# Patient Record
Sex: FEMALE | Race: ASIAN | NOT HISPANIC OR LATINO
[De-identification: names, ages, dates, MRNs, and addresses within clinical notes are randomized per-mention and may not be internally consistent; named-entity substitution may affect disease eponyms.]

---

## 2019-01-10 PROBLEM — Z00.00 ENCOUNTER FOR PREVENTIVE HEALTH EXAMINATION: Status: ACTIVE | Noted: 2019-01-10

## 2020-07-13 DIAGNOSIS — Z01.818 ENCOUNTER FOR OTHER PREPROCEDURAL EXAMINATION: ICD-10-CM

## 2020-07-14 ENCOUNTER — APPOINTMENT (OUTPATIENT)
Dept: DISASTER EMERGENCY | Facility: CLINIC | Age: 30
End: 2020-07-14

## 2020-07-15 LAB — SARS-COV-2 N GENE NPH QL NAA+PROBE: NOT DETECTED

## 2020-07-16 ENCOUNTER — TRANSCRIPTION ENCOUNTER (OUTPATIENT)
Age: 30
End: 2020-07-16

## 2020-07-17 ENCOUNTER — OUTPATIENT (OUTPATIENT)
Dept: OUTPATIENT SERVICES | Facility: HOSPITAL | Age: 30
LOS: 1 days | Discharge: HOME | End: 2020-07-17

## 2022-02-04 ENCOUNTER — APPOINTMENT (OUTPATIENT)
Dept: ANTEPARTUM | Facility: CLINIC | Age: 32
End: 2022-02-04
Payer: COMMERCIAL

## 2022-02-04 ENCOUNTER — ASOB RESULT (OUTPATIENT)
Age: 32
End: 2022-02-04

## 2022-02-04 ENCOUNTER — NON-APPOINTMENT (OUTPATIENT)
Age: 32
End: 2022-02-04

## 2022-02-04 PROCEDURE — 36416 COLLJ CAPILLARY BLOOD SPEC: CPT

## 2022-02-04 PROCEDURE — ZZZZZ: CPT

## 2022-02-04 PROCEDURE — 76813 OB US NUCHAL MEAS 1 GEST: CPT

## 2022-02-07 LAB
1ST TRIMESTER DATA: NORMAL
ADDENDUM DOC: NORMAL
AFP PNL SERPL: NORMAL
AFP SERPL-ACNC: NORMAL
CLINICAL BIOCHEMIST REVIEW: NORMAL
FREE BETA HCG 1ST TRIMESTER: NORMAL
Lab: NORMAL
NASAL BONE: PRESENT
NOTES NTD: NORMAL
NT: NORMAL
PAPP-A SERPL-ACNC: NORMAL
TRISOMY 18/3: NORMAL

## 2022-04-01 ENCOUNTER — ASOB RESULT (OUTPATIENT)
Age: 32
End: 2022-04-01

## 2022-04-01 ENCOUNTER — APPOINTMENT (OUTPATIENT)
Dept: ANTEPARTUM | Facility: CLINIC | Age: 32
End: 2022-04-01
Payer: COMMERCIAL

## 2022-04-01 PROCEDURE — 76811 OB US DETAILED SNGL FETUS: CPT

## 2022-05-05 ENCOUNTER — OUTPATIENT (OUTPATIENT)
Dept: OUTPATIENT SERVICES | Facility: HOSPITAL | Age: 32
LOS: 1 days | Discharge: HOME | End: 2022-05-05
Payer: COMMERCIAL

## 2022-05-05 VITALS — HEART RATE: 100 BPM | DIASTOLIC BLOOD PRESSURE: 81 MMHG | SYSTOLIC BLOOD PRESSURE: 124 MMHG

## 2022-05-05 VITALS
DIASTOLIC BLOOD PRESSURE: 82 MMHG | HEART RATE: 95 BPM | RESPIRATION RATE: 16 BRPM | TEMPERATURE: 99 F | SYSTOLIC BLOOD PRESSURE: 140 MMHG

## 2022-05-05 DIAGNOSIS — Z3A.00 WEEKS OF GESTATION OF PREGNANCY NOT SPECIFIED: ICD-10-CM

## 2022-05-05 DIAGNOSIS — Z98.890 OTHER SPECIFIED POSTPROCEDURAL STATES: Chronic | ICD-10-CM

## 2022-05-05 DIAGNOSIS — O26.899 OTHER SPECIFIED PREGNANCY RELATED CONDITIONS, UNSPECIFIED TRIMESTER: ICD-10-CM

## 2022-05-05 PROCEDURE — 99213 OFFICE O/P EST LOW 20 MIN: CPT

## 2022-05-05 PROCEDURE — 99214 OFFICE O/P EST MOD 30 MIN: CPT | Mod: 25

## 2022-05-05 NOTE — OB PROVIDER TRIAGE NOTE - ADDITIONAL INSTRUCTIONS
Please drink 1-2 liters of fluid per day. Please go to your next scheduled prenatal appointment on 5/12/2022. Please drink 1-2 liters of fluid per day. Please go to your next scheduled prenatal appointment on 6/2/2022.

## 2022-05-05 NOTE — OB RN TRIAGE NOTE - NSLDARRIVAL_OBGYN_ALL_OB_DIFF_HHMM
Vaccine Information Statement(s) was given today. This has been reviewed, questions answered, and verbal consent given by Patient for injection(s) and administration of Hepatitis B.     Patient tolerated without incident. See immunization grid for documentation.     Patient informed to come back in  5 months for third injection.  
3 Hour(s) 16 Minute(s)

## 2022-05-05 NOTE — OB PROVIDER TRIAGE NOTE - NSOBPROVIDERNOTE_OBGYN_ALL_OB_FT
a/p: 32 y.o.  GENO 2022 @ 26 weeks hx decreased FM a/p: 32 y.o.  GENO 2022 @ 26 weeks hx decreased FM    1142a Discussed with Dr Whitten-Edilberto - reviewing NST. Plan for continued monitoring x 30 mins.    Viral, NP a/p: 32 y.o.  GENO 2022 @ 26 weeks hx decreased FM    1142a Discussed with Dr Phoenix - reviewing NST. Plan for continued monitoring x 30 mins.      1245p  Discussed with Dr Fish Kessler. Dr Fish Casanova reviewing NST. Plan for discharge home. Pt to follow up with next scheduled prenatal appointment.  labor precautions/fetal kick counts reviewed. Encouraged increased PO hydration.     Plan of care reviewed with patient and patient mother. Pt states understanding of above. In total ~ 1 hour spent with established/new patient.    Viral, NP a/p: 32 y.o.  GENO 2022 @ 26 weeks hx decreased FM    1142a Discussed with Dr Phoenix - reviewing NST. Plan for continued monitoring x 30 mins.      1245p  Discussed with Dr Fish Kessler. Dr Fish Casanova reviewing NST. Plan for discharge home. Pt to follow up with next scheduled prenatal appointment 2022.  labor precautions/fetal kick counts reviewed. Encouraged increased PO hydration.     Plan of care reviewed with patient and patient mother. Pt states understanding of above. In total ~ 1 hour spent with established/new patient.    Viral, NP

## 2022-05-05 NOTE — OB PROVIDER TRIAGE NOTE - HISTORY OF PRESENT ILLNESS
32 y.o.  GENO 2022 @ 26 weeks presents s/p decreased FM x 3 days. Pt states she presented to OB office yesterday and was seen by Dr Menchaca. Pt states she was advised to come immediately to triage for ultrasound and monitoring however pt was unable. Pt states +FM. Denies LOF, VB. States uncomplicated antepartum course.    allergies:  NKDA  medications:  prenatal vitamins    med/surg hx:  breast reduction age 21   32 y.o.  GENO 2022 @ 26 weeks presents s/p decreased FM x 3 days. Pt states she presented to OB office yesterday and was seen by Dr Menchaca. Pt states she was advised to come immediately to triage for ultrasound and monitoring however pt was unable. Pt states +FM. Denies LOF, VB. States uncomplicated antepartum course.    allergies:  NKDA  medications:  prenatal vitamins    med/surg/OBGYN hx:  breast reduction age 21

## 2022-05-05 NOTE — OB RN TRIAGE NOTE - FALL HARM RISK - UNIVERSAL INTERVENTIONS
Bed in lowest position, wheels locked, appropriate side rails in place/Call bell, personal items and telephone in reach/Instruct patient to call for assistance before getting out of bed or chair/Non-slip footwear when patient is out of bed/[place] to call system/Physically safe environment - no spills, clutter or unnecessary equipment/Purposeful Proactive Rounding/Room/bathroom lighting operational, light cord in reach

## 2022-05-24 NOTE — OB RN TRIAGE NOTE - AS TEMP SITE
Family brought ED room for update. Dietary called for bereavement tray. AC called for spiritual care.    oral

## 2022-06-20 ENCOUNTER — APPOINTMENT (OUTPATIENT)
Dept: ANTEPARTUM | Facility: CLINIC | Age: 32
End: 2022-06-20
Payer: COMMERCIAL

## 2022-06-20 ENCOUNTER — ASOB RESULT (OUTPATIENT)
Age: 32
End: 2022-06-20

## 2022-06-20 PROCEDURE — 76816 OB US FOLLOW-UP PER FETUS: CPT

## 2022-08-18 ENCOUNTER — INPATIENT (INPATIENT)
Facility: HOSPITAL | Age: 32
LOS: 2 days | Discharge: HOME | End: 2022-08-21
Attending: OBSTETRICS & GYNECOLOGY | Admitting: OBSTETRICS & GYNECOLOGY

## 2022-08-18 VITALS
DIASTOLIC BLOOD PRESSURE: 87 MMHG | RESPIRATION RATE: 17 BRPM | HEART RATE: 80 BPM | SYSTOLIC BLOOD PRESSURE: 142 MMHG | TEMPERATURE: 98 F

## 2022-08-18 DIAGNOSIS — O26.899 OTHER SPECIFIED PREGNANCY RELATED CONDITIONS, UNSPECIFIED TRIMESTER: ICD-10-CM

## 2022-08-18 DIAGNOSIS — Z98.890 OTHER SPECIFIED POSTPROCEDURAL STATES: Chronic | ICD-10-CM

## 2022-08-18 DIAGNOSIS — Z3A.00 WEEKS OF GESTATION OF PREGNANCY NOT SPECIFIED: ICD-10-CM

## 2022-08-18 NOTE — OB RN TRIAGE NOTE - FALL HARM RISK - UNIVERSAL INTERVENTIONS
Bed in lowest position, wheels locked, appropriate side rails in place/Call bell, personal items and telephone in reach/Instruct patient to call for assistance before getting out of bed or chair/Non-slip footwear when patient is out of bed/[place] to call system/Physically safe environment - no spills, clutter or unnecessary equipment/Purposeful Proactive Rounding/Room/bathroom lighting operational, light cord in reach

## 2022-08-18 NOTE — OB RN TRIAGE NOTE - CHIEF COMPLAINT QUOTE
"Pressure on the back of my body, it's  not  contractions, I am scheduled for IOL for post due dates tomorrow"

## 2022-08-19 ENCOUNTER — TRANSCRIPTION ENCOUNTER (OUTPATIENT)
Age: 32
End: 2022-08-19

## 2022-08-19 DIAGNOSIS — O61.9 FAILED INDUCTION OF LABOR, UNSPECIFIED: ICD-10-CM

## 2022-08-19 PROBLEM — Z78.9 OTHER SPECIFIED HEALTH STATUS: Chronic | Status: ACTIVE | Noted: 2022-05-05

## 2022-08-19 LAB
ALBUMIN SERPL ELPH-MCNC: 3.5 G/DL — SIGNIFICANT CHANGE UP (ref 3.3–5)
ALP SERPL-CCNC: 308 U/L — HIGH (ref 40–120)
ALT FLD-CCNC: 10 U/L — SIGNIFICANT CHANGE UP (ref 4–33)
ANION GAP SERPL CALC-SCNC: 13 MMOL/L — SIGNIFICANT CHANGE UP (ref 7–14)
APPEARANCE UR: ABNORMAL
APTT BLD: 26 SEC — LOW (ref 27–36.3)
AST SERPL-CCNC: 16 U/L — SIGNIFICANT CHANGE UP (ref 4–32)
BACTERIA # UR AUTO: ABNORMAL
BASOPHILS # BLD AUTO: 0.05 K/UL — SIGNIFICANT CHANGE UP (ref 0–0.2)
BASOPHILS NFR BLD AUTO: 0.5 % — SIGNIFICANT CHANGE UP (ref 0–2)
BILIRUB SERPL-MCNC: <0.2 MG/DL — SIGNIFICANT CHANGE UP (ref 0.2–1.2)
BILIRUB UR-MCNC: NEGATIVE — SIGNIFICANT CHANGE UP
BLD GP AB SCN SERPL QL: NEGATIVE — SIGNIFICANT CHANGE UP
BUN SERPL-MCNC: 7 MG/DL — SIGNIFICANT CHANGE UP (ref 7–23)
CALCIUM SERPL-MCNC: 9 MG/DL — SIGNIFICANT CHANGE UP (ref 8.4–10.5)
CHLORIDE SERPL-SCNC: 103 MMOL/L — SIGNIFICANT CHANGE UP (ref 98–107)
CO2 SERPL-SCNC: 19 MMOL/L — LOW (ref 22–31)
COLOR SPEC: YELLOW — SIGNIFICANT CHANGE UP
COVID-19 SPIKE DOMAIN AB INTERP: POSITIVE
COVID-19 SPIKE DOMAIN ANTIBODY RESULT: >250 U/ML — HIGH
CREAT ?TM UR-MCNC: 178 MG/DL — SIGNIFICANT CHANGE UP
CREAT SERPL-MCNC: 0.56 MG/DL — SIGNIFICANT CHANGE UP (ref 0.5–1.3)
DIFF PNL FLD: ABNORMAL
EGFR: 126 ML/MIN/1.73M2 — SIGNIFICANT CHANGE UP
EOSINOPHIL # BLD AUTO: 0.09 K/UL — SIGNIFICANT CHANGE UP (ref 0–0.5)
EOSINOPHIL NFR BLD AUTO: 0.8 % — SIGNIFICANT CHANGE UP (ref 0–6)
EPI CELLS # UR: 19 /HPF — HIGH (ref 0–5)
FIBRINOGEN PPP-MCNC: 722 MG/DL — HIGH (ref 330–520)
GLUCOSE SERPL-MCNC: 92 MG/DL — SIGNIFICANT CHANGE UP (ref 70–99)
GLUCOSE UR QL: NEGATIVE — SIGNIFICANT CHANGE UP
HCT VFR BLD CALC: 38.9 % — SIGNIFICANT CHANGE UP (ref 34.5–45)
HGB BLD-MCNC: 12.3 G/DL — SIGNIFICANT CHANGE UP (ref 11.5–15.5)
HYALINE CASTS # UR AUTO: 7 /LPF — SIGNIFICANT CHANGE UP (ref 0–7)
IANC: 6.95 K/UL — SIGNIFICANT CHANGE UP (ref 1.8–7.4)
IMM GRANULOCYTES NFR BLD AUTO: 0.5 % — SIGNIFICANT CHANGE UP (ref 0–1.5)
INR BLD: 0.95 RATIO — SIGNIFICANT CHANGE UP (ref 0.88–1.16)
KETONES UR-MCNC: NEGATIVE — SIGNIFICANT CHANGE UP
LDH SERPL L TO P-CCNC: 162 U/L — SIGNIFICANT CHANGE UP (ref 135–225)
LEUKOCYTE ESTERASE UR-ACNC: NEGATIVE — SIGNIFICANT CHANGE UP
LYMPHOCYTES # BLD AUTO: 2.89 K/UL — SIGNIFICANT CHANGE UP (ref 1–3.3)
LYMPHOCYTES # BLD AUTO: 26.8 % — SIGNIFICANT CHANGE UP (ref 13–44)
MCHC RBC-ENTMCNC: 24.3 PG — LOW (ref 27–34)
MCHC RBC-ENTMCNC: 31.6 GM/DL — LOW (ref 32–36)
MCV RBC AUTO: 76.7 FL — LOW (ref 80–100)
MONOCYTES # BLD AUTO: 0.77 K/UL — SIGNIFICANT CHANGE UP (ref 0–0.9)
MONOCYTES NFR BLD AUTO: 7.1 % — SIGNIFICANT CHANGE UP (ref 2–14)
NEUTROPHILS # BLD AUTO: 6.95 K/UL — SIGNIFICANT CHANGE UP (ref 1.8–7.4)
NEUTROPHILS NFR BLD AUTO: 64.3 % — SIGNIFICANT CHANGE UP (ref 43–77)
NITRITE UR-MCNC: NEGATIVE — SIGNIFICANT CHANGE UP
NRBC # BLD: 0 /100 WBCS — SIGNIFICANT CHANGE UP (ref 0–0)
NRBC # FLD: 0 K/UL — SIGNIFICANT CHANGE UP (ref 0–0)
PH UR: 6.5 — SIGNIFICANT CHANGE UP (ref 5–8)
PLATELET # BLD AUTO: 298 K/UL — SIGNIFICANT CHANGE UP (ref 150–400)
POTASSIUM SERPL-MCNC: 4 MMOL/L — SIGNIFICANT CHANGE UP (ref 3.5–5.3)
POTASSIUM SERPL-SCNC: 4 MMOL/L — SIGNIFICANT CHANGE UP (ref 3.5–5.3)
PROT ?TM UR-MCNC: 48 MG/DL — SIGNIFICANT CHANGE UP
PROT ?TM UR-MCNC: 48 MG/DL — SIGNIFICANT CHANGE UP
PROT SERPL-MCNC: 6.9 G/DL — SIGNIFICANT CHANGE UP (ref 6–8.3)
PROT UR-MCNC: ABNORMAL
PROT/CREAT UR-RTO: 0.3 RATIO — HIGH (ref 0–0.2)
PROTHROM AB SERPL-ACNC: 11 SEC — SIGNIFICANT CHANGE UP (ref 10.5–13.4)
RBC # BLD: 5.07 M/UL — SIGNIFICANT CHANGE UP (ref 3.8–5.2)
RBC # FLD: 15.8 % — HIGH (ref 10.3–14.5)
RBC CASTS # UR COMP ASSIST: 3 /HPF — SIGNIFICANT CHANGE UP (ref 0–4)
RH IG SCN BLD-IMP: POSITIVE — SIGNIFICANT CHANGE UP
RH IG SCN BLD-IMP: POSITIVE — SIGNIFICANT CHANGE UP
SARS-COV-2 IGG+IGM SERPL QL IA: >250 U/ML — HIGH
SARS-COV-2 IGG+IGM SERPL QL IA: POSITIVE
SARS-COV-2 RNA SPEC QL NAA+PROBE: SIGNIFICANT CHANGE UP
SODIUM SERPL-SCNC: 135 MMOL/L — SIGNIFICANT CHANGE UP (ref 135–145)
SP GR SPEC: 1.03 — SIGNIFICANT CHANGE UP (ref 1.01–1.05)
T PALLIDUM AB TITR SER: NEGATIVE — SIGNIFICANT CHANGE UP
URATE SERPL-MCNC: 3.8 MG/DL — SIGNIFICANT CHANGE UP (ref 2.5–7)
UROBILINOGEN FLD QL: SIGNIFICANT CHANGE UP
WBC # BLD: 10.8 K/UL — HIGH (ref 3.8–10.5)
WBC # FLD AUTO: 10.8 K/UL — HIGH (ref 3.8–10.5)
WBC UR QL: 11 /HPF — HIGH (ref 0–5)

## 2022-08-19 RX ORDER — AMPICILLIN TRIHYDRATE 250 MG
1 CAPSULE ORAL EVERY 4 HOURS
Refills: 0 | Status: DISCONTINUED | OUTPATIENT
Start: 2022-08-19 | End: 2022-08-19

## 2022-08-19 RX ORDER — CHLORHEXIDINE GLUCONATE 213 G/1000ML
1 SOLUTION TOPICAL ONCE
Refills: 0 | Status: DISCONTINUED | OUTPATIENT
Start: 2022-08-19 | End: 2022-08-19

## 2022-08-19 RX ORDER — PRAMOXINE HYDROCHLORIDE 150 MG/15G
1 AEROSOL, FOAM RECTAL EVERY 4 HOURS
Refills: 0 | Status: DISCONTINUED | OUTPATIENT
Start: 2022-08-19 | End: 2022-08-21

## 2022-08-19 RX ORDER — OXYTOCIN 10 UNIT/ML
2 VIAL (ML) INJECTION
Qty: 30 | Refills: 0 | Status: DISCONTINUED | OUTPATIENT
Start: 2022-08-19 | End: 2022-08-19

## 2022-08-19 RX ORDER — CHLORHEXIDINE GLUCONATE 213 G/1000ML
1 SOLUTION TOPICAL ONCE
Refills: 0 | Status: DISCONTINUED | OUTPATIENT
Start: 2022-08-18 | End: 2022-08-19

## 2022-08-19 RX ORDER — ONDANSETRON 8 MG/1
4 TABLET, FILM COATED ORAL ONCE
Refills: 0 | Status: COMPLETED | OUTPATIENT
Start: 2022-08-19 | End: 2022-08-19

## 2022-08-19 RX ORDER — AMPICILLIN TRIHYDRATE 250 MG
1 CAPSULE ORAL EVERY 4 HOURS
Refills: 0 | Status: DISCONTINUED | OUTPATIENT
Start: 2022-08-18 | End: 2022-08-19

## 2022-08-19 RX ORDER — OXYCODONE HYDROCHLORIDE 5 MG/1
5 TABLET ORAL ONCE
Refills: 0 | Status: DISCONTINUED | OUTPATIENT
Start: 2022-08-19 | End: 2022-08-21

## 2022-08-19 RX ORDER — AMPICILLIN TRIHYDRATE 250 MG
2 CAPSULE ORAL ONCE
Refills: 0 | Status: COMPLETED | OUTPATIENT
Start: 2022-08-19 | End: 2022-08-19

## 2022-08-19 RX ORDER — AER TRAVELER 0.5 G/1
1 SOLUTION RECTAL; TOPICAL EVERY 4 HOURS
Refills: 0 | Status: DISCONTINUED | OUTPATIENT
Start: 2022-08-19 | End: 2022-08-21

## 2022-08-19 RX ORDER — LANOLIN
1 OINTMENT (GRAM) TOPICAL EVERY 6 HOURS
Refills: 0 | Status: DISCONTINUED | OUTPATIENT
Start: 2022-08-19 | End: 2022-08-21

## 2022-08-19 RX ORDER — OXYTOCIN 10 UNIT/ML
333.33 VIAL (ML) INJECTION
Qty: 20 | Refills: 0 | Status: COMPLETED | OUTPATIENT
Start: 2022-08-18 | End: 2022-08-18

## 2022-08-19 RX ORDER — DIBUCAINE 1 %
1 OINTMENT (GRAM) RECTAL EVERY 6 HOURS
Refills: 0 | Status: DISCONTINUED | OUTPATIENT
Start: 2022-08-19 | End: 2022-08-21

## 2022-08-19 RX ORDER — ACETAMINOPHEN 500 MG
975 TABLET ORAL
Refills: 0 | Status: DISCONTINUED | OUTPATIENT
Start: 2022-08-19 | End: 2022-08-21

## 2022-08-19 RX ORDER — OXYCODONE HYDROCHLORIDE 5 MG/1
5 TABLET ORAL
Refills: 0 | Status: DISCONTINUED | OUTPATIENT
Start: 2022-08-19 | End: 2022-08-21

## 2022-08-19 RX ORDER — SODIUM CHLORIDE 9 MG/ML
1000 INJECTION, SOLUTION INTRAVENOUS
Refills: 0 | Status: DISCONTINUED | OUTPATIENT
Start: 2022-08-19 | End: 2022-08-19

## 2022-08-19 RX ORDER — SIMETHICONE 80 MG/1
80 TABLET, CHEWABLE ORAL EVERY 4 HOURS
Refills: 0 | Status: DISCONTINUED | OUTPATIENT
Start: 2022-08-19 | End: 2022-08-21

## 2022-08-19 RX ORDER — DIPHENHYDRAMINE HCL 50 MG
25 CAPSULE ORAL EVERY 6 HOURS
Refills: 0 | Status: DISCONTINUED | OUTPATIENT
Start: 2022-08-19 | End: 2022-08-21

## 2022-08-19 RX ORDER — OXYTOCIN 10 UNIT/ML
333.33 VIAL (ML) INJECTION
Qty: 20 | Refills: 0 | Status: DISCONTINUED | OUTPATIENT
Start: 2022-08-19 | End: 2022-08-20

## 2022-08-19 RX ORDER — MAGNESIUM HYDROXIDE 400 MG/1
30 TABLET, CHEWABLE ORAL
Refills: 0 | Status: DISCONTINUED | OUTPATIENT
Start: 2022-08-19 | End: 2022-08-21

## 2022-08-19 RX ORDER — TETANUS TOXOID, REDUCED DIPHTHERIA TOXOID AND ACELLULAR PERTUSSIS VACCINE, ADSORBED 5; 2.5; 8; 8; 2.5 [IU]/.5ML; [IU]/.5ML; UG/.5ML; UG/.5ML; UG/.5ML
0.5 SUSPENSION INTRAMUSCULAR ONCE
Refills: 0 | Status: DISCONTINUED | OUTPATIENT
Start: 2022-08-19 | End: 2022-08-21

## 2022-08-19 RX ORDER — HYDROCORTISONE 1 %
1 OINTMENT (GRAM) TOPICAL EVERY 6 HOURS
Refills: 0 | Status: DISCONTINUED | OUTPATIENT
Start: 2022-08-19 | End: 2022-08-21

## 2022-08-19 RX ORDER — SODIUM CHLORIDE 9 MG/ML
3 INJECTION INTRAMUSCULAR; INTRAVENOUS; SUBCUTANEOUS EVERY 8 HOURS
Refills: 0 | Status: DISCONTINUED | OUTPATIENT
Start: 2022-08-19 | End: 2022-08-21

## 2022-08-19 RX ORDER — IBUPROFEN 200 MG
600 TABLET ORAL EVERY 6 HOURS
Refills: 0 | Status: COMPLETED | OUTPATIENT
Start: 2022-08-19 | End: 2023-07-18

## 2022-08-19 RX ORDER — BENZOCAINE 10 %
1 GEL (GRAM) MUCOUS MEMBRANE EVERY 6 HOURS
Refills: 0 | Status: DISCONTINUED | OUTPATIENT
Start: 2022-08-19 | End: 2022-08-21

## 2022-08-19 RX ORDER — OXYTOCIN 10 UNIT/ML
VIAL (ML) INJECTION
Qty: 30 | Refills: 0 | Status: DISCONTINUED | OUTPATIENT
Start: 2022-08-19 | End: 2022-08-19

## 2022-08-19 RX ORDER — SODIUM CHLORIDE 9 MG/ML
1000 INJECTION, SOLUTION INTRAVENOUS
Refills: 0 | Status: DISCONTINUED | OUTPATIENT
Start: 2022-08-18 | End: 2022-08-19

## 2022-08-19 RX ORDER — KETOROLAC TROMETHAMINE 30 MG/ML
30 SYRINGE (ML) INJECTION ONCE
Refills: 0 | Status: DISCONTINUED | OUTPATIENT
Start: 2022-08-19 | End: 2022-08-19

## 2022-08-19 RX ADMIN — Medication 30 MILLIGRAM(S): at 19:45

## 2022-08-19 RX ADMIN — Medication 216 GRAM(S): at 05:28

## 2022-08-19 RX ADMIN — Medication 1000 MILLIUNIT(S)/MIN: at 21:18

## 2022-08-19 RX ADMIN — SODIUM CHLORIDE 125 MILLILITER(S): 9 INJECTION, SOLUTION INTRAVENOUS at 05:27

## 2022-08-19 RX ADMIN — ONDANSETRON 4 MILLIGRAM(S): 8 TABLET, FILM COATED ORAL at 18:12

## 2022-08-19 RX ADMIN — Medication 108 GRAM(S): at 17:17

## 2022-08-19 RX ADMIN — Medication 108 GRAM(S): at 09:10

## 2022-08-19 RX ADMIN — Medication 30 MILLIGRAM(S): at 20:15

## 2022-08-19 RX ADMIN — Medication 2 MILLIUNIT(S)/MIN: at 09:10

## 2022-08-19 RX ADMIN — SODIUM CHLORIDE 125 MILLILITER(S): 9 INJECTION, SOLUTION INTRAVENOUS at 09:11

## 2022-08-19 RX ADMIN — SODIUM CHLORIDE 125 MILLILITER(S): 9 INJECTION, SOLUTION INTRAVENOUS at 13:25

## 2022-08-19 RX ADMIN — Medication 2 MILLIUNIT(S)/MIN: at 16:23

## 2022-08-19 RX ADMIN — Medication 108 GRAM(S): at 13:25

## 2022-08-19 NOTE — DISCHARGE NOTE OB - IF YOU HAVE EPISIOTOMY OR TEAR REPAIR, USE WARM WATER SITZ BATH FOR RELIEF OF DISCOMFORT
INDICATION:

CALCULUS OF KIDNEY



COMPARISON:

04/16/2021



TECHNIQUE:

Real time gray scale ultrasound examination using curved array transducer.



FINDINGS:

Left kidney is normal in contour, size, echogenicity and reniform shape measuring 9.9

x 4.2 x 4.3 cm without hydronephrosis, nephrolithiasis, cystic or renal mass lesion.



The right kidney is normal in contour, size, echogenicity and reniform shape measuring

10.5 x 4.0 x 4.2 cm with 6 mm lower pole nonobstructing calculus.  No hydronephrosis,

cystic or renal mass lesion.



Bladder is normal and bilateral ureteral jets are identified.





IMPRESSION:

6 mm nonobstructing right renal calculus.  No hydronephrosis.





<Electronically signed by Rui Nichols > 05/10/21 1524 Statement Selected

## 2022-08-19 NOTE — DISCHARGE NOTE OB - MEDICATION SUMMARY - MEDICATIONS TO TAKE
I will START or STAY ON the medications listed below when I get home from the hospital:    ibuprofen 200 mg oral tablet  -- 3 tab(s) by mouth every 6 hours  -- Indication: For pain    Tylenol 325 mg oral tablet  -- 3 tab(s) by mouth every 6 hours  -- Indication: For pain    Prenatal 1 oral capsule  -- Indication: For supplement

## 2022-08-19 NOTE — OB PROVIDER H&P - ASSESSMENT
32y  at 41w0d with oligohydramnios  Pt with elevated BP in triage, asymptomatic  GBS: Positive   D/w Dr Landrum  -Admit to labor and delivery  -Pain Management prn  -Cont EFM/[place]  -Admission labs: CBC, RPR, T&S and PEC labs sent  -IV hydration  -Clear liquid diet  -Buccal Cytotec for IOL  -Ampicillin for GBS prophylaxis

## 2022-08-19 NOTE — OB PROVIDER H&P - ATTENDING COMMENTS
OB Attending Note    P0 presents w/ early labor, found incidentally to have oligohydramnios.  Admitted for IOL for oligohydramnios and late term.  Reassuring fetal status with reactive NST.  Labile BP's on admission, not meeting criteria for hypertensive disorder.  HELLP labs pending.    For buccal cytotec and cervical balloon for IOL.     JUDIE Landrum MD

## 2022-08-19 NOTE — OB PROVIDER LABOR PROGRESS NOTE - ASSESSMENT
Plan: 32y y/o  @ 41w4d in stable condition  - Cont'd IOL with buccal cytotec  - Cervical balloon placed; uterine and vaginal balloons instilled with 70cc sterile saline, pt tolerated well  - Continuous EFM, [place]  - Con't IVF    d/w attending physician Dr. Lucita Pereira MD  PGY-2

## 2022-08-19 NOTE — DISCHARGE NOTE OB - PATIENT PORTAL LINK FT
You can access the FollowMyHealth Patient Portal offered by [facility] by registering at the following website: [email or web address]. By joining [employer]’s FollowMyHealth portal, you will also be able to view your health information using other applications (apps) compatible with our system.

## 2022-08-19 NOTE — OB PROVIDER LABOR PROGRESS NOTE - NS_SUBJECTIVE/OBJECTIVE_OBGYN_ALL_OB_FT
R2 Labor & Delivery Progress Note     Pt seen & examined at bedside for placement of cervical balloon.    T(C): 36.9 (08-19-22 @ 05:22), Max: 36.9 (08-19-22 @ 04:35)  HR: 80 (08-19-22 @ 05:21) (74 - 92)  BP: 128/96 (08-19-22 @ 05:21) (128/96 - 151/104)  RR: 18 (08-19-22 @ 04:35) (17 - 18)  SpO2: --

## 2022-08-19 NOTE — DISCHARGE NOTE OB - CARE PLAN
Assessment and plan of treatment:	Follow-up for appointment for blood pressure check in 1 week.  Check blood pressure at home. Call your doctor if greater than 150/90, or if you have headache, changes in vision or pain in upper abdomen.  Nothing per vagina x 6 weeks.  Full postpartum check at 6weeks.   1 Principal Discharge DX:	Vaginal delivery  Assessment and plan of treatment:	Follow-up for appointment for blood pressure check in 1 week.  Check blood pressure at home. Call your doctor if greater than 150/90, or if you have headache, changes in vision or pain in upper abdomen.  Nothing per vagina x 6 weeks.  Full postpartum check at 6weeks.

## 2022-08-19 NOTE — CHART NOTE - NSCHARTNOTEFT_GEN_A_CORE
PA Note    called by RN to bedside for 4 min prolonged deceleration. Upon arrival to room 's, patient on L lateral, VE performed 7-8/90/-1 ISE placed for continuous monitoring. Dr Fihs Casanova at bedside.  Patient repositioned. Charge RN made aware that patient needs to be transferred to LDR. Resuscitative measures in place. Category II tracing     cont to closely monitor  ldevito pa PA Note    called by RN to bedside for 4 min prolonged deceleration. Upon arrival to room 's & pitocin was discontinued, patient on L lateral, VE performed 7-8/90/-1 ISE placed for continuous monitoring. Dr Fish Casanova at bedside.  Patient repositioned. Charge RN made aware that patient needs to be transferred to LDR. Resuscitative measures in place. Category II tracing     cont to closely monitor  evito pa

## 2022-08-19 NOTE — OB RN DELIVERY SUMMARY - NSSELHIDDEN_OBGYN_ALL_OB_FT
[NS_DeliveryAttending1_OBGYN_ALL_OB_FT:[id number]],[NS_DeliveryAttending2_OBGYN_ALL_OB_FT:[id number]],[NS_DeliveryRN_OBGYN_ALL_OB_FT:[id number]=]

## 2022-08-19 NOTE — OB PROVIDER DELIVERY SUMMARY - NSPROVIDERDELIVERYNOTE_OBGYN_ALL_OB_FT
Pt fully dilated and pushing.  Delivered viable infant over intact perineum.  Spontaneous cry.  Nose and mouth bulb suctioned.  Cord clamped and cut after delay.  Infant handed to awaiting pediatricians.  Cord gases sent.  Placenta delivered spontaneously and intact.  2nd degree laceration repaired with 2-0 chromic with excellent hemostasis and restoration of anatomy.  Fundus firm.  Vault empty.

## 2022-08-19 NOTE — PROVIDER CONTACT NOTE (OTHER) - ASSESSMENT
Patient is asymptomatic. No complaints of pain or dizziness; Patient ambulated to the bathroom and voided. Moderate bleeding fundus is at umbilicus

## 2022-08-19 NOTE — CHART NOTE - NSCHARTNOTEFT_GEN_A_CORE
OB Attending    Pt reexamined at bedside.  VE 10/100/+1.  Cat I FHT.  Plan to start pushing.    MD Israel

## 2022-08-19 NOTE — CHART NOTE - NSCHARTNOTEFT_GEN_A_CORE
PA Note    seen & examined for cont of management  comfortable    VS  T(C): 36.7 (08-19-22 @ 13:23)  HR: 98 (08-19-22 @ 13:50)  BP: 126/88 (08-19-22 @ 13:43)  RR: 18 (08-19-22 @ 04:35)  SpO2: 92% (08-19-22 @ 13:50)    130/mod cary/+accels/no decels  [place] irreg   5/80/-2 AROM thick meconium    cont efm/toco  cont pitocin  dw Dr Fish ocampo

## 2022-08-19 NOTE — OB PROVIDER LABOR PROGRESS NOTE - ASSESSMENT
33 yo  at 41/1 weeks  - notified by RN that cervical balloon fell out  - plan for pitocin  - cont EFM/toco    d/w Dr Alban kumari PA-C 31 yo  at 41/1 weeks  - notified by RN that cervical balloon fell out  - plan for pitocin  - cont EFM/toco    d/w Dr Alban kumari PA-C    Attending Note   Agree with above  Pt wants epidural now   AROM with next exam     VINCE Phoenix MD

## 2022-08-19 NOTE — DISCHARGE NOTE OB - NS MD DC FALL RISK RISK
For information on Fall & Injury Prevention, visit: [email or web address] OR  [email or web address] OR  https://www.cdc.gov/steadi/patient.html

## 2022-08-19 NOTE — OB PROVIDER H&P - HISTORY OF PRESENT ILLNESS
32y  at 41w0d presents to triage c/o lower back pain and pelvic pressure.  Reports +FM, no vaginal bleeding, no ROM or LOF  Prenatal care: Dr Rahman, denies any prenatal complications.  Pt is scheduled for induction of labor tomorrow the .  GBS: Positive    GYN: Denies any h/o STDs, fibroids, ovarian Cyst, or abnormal pap smear  OBH:   PAST MEDICAL & SURGICAL HISTORY:  age 21  bilateral breast reduction surgery  No Known Allergies

## 2022-08-19 NOTE — DISCHARGE NOTE OB - PLAN OF CARE
Follow-up for appointment for blood pressure check in 1 week.  Check blood pressure at home. Call your doctor if greater than 150/90, or if you have headache, changes in vision or pain in upper abdomen.  Nothing per vagina x 6 weeks.  Full postpartum check at 6weeks.

## 2022-08-19 NOTE — OB RN DELIVERY SUMMARY - NS_SEPSISRSKCALC_OBGYN_ALL_OB_FT
EOS calculated successfully. EOS Risk Factor: 0.5/1000 live births ([facility] national incidence); GA=41w1d; Temp=98.6; ROM=5.9; GBS='Positive'; Antibiotics='GBS specific antibiotics > 2 hrs prior to birth'

## 2022-08-19 NOTE — OB NEONATOLOGY/PEDIATRICIAN DELIVERY SUMMARY - NSPEDSNEONOTESA_OBGYN_ALL_OB_FT
Requested by OB to attend this vaginal delivery at 41 weeks for oligohydramnios, meconium. Mother is a 32 year old,  , blood type B pos.  Prenatal labs as follow: HIV neg, RPR non-reactive, rubella nonimmune, HBsA neg, GBS pos on , s/p ampicillin x 4.  This prenancy was complicated by gestational HTN, oligohydramnios. ROM at 1320 with mec fluid - ~ 6 hours prior to delivery.  Infant emerged vertex, vigorous, with good tone. Delayed cord clamping X 60  secs, then brought to warmer. Dried, suctioned and stimulated . Apgars 9 /9. Mom wishes to breast/bottle feed. Consents to hep B vaccine. Consents to circumcision. Infant admitted to NBN for routine care. Parents updated.

## 2022-08-19 NOTE — OB PROVIDER H&P - NSHPPHYSICALEXAM_GEN_ALL_CORE
T(C): 36.6 (08-18-22 @ 21:46), Max: 36.6 (08-18-22 @ 21:46)  HR: 86 (08-19-22 @ 00:26) (79 - 88)  BP: 144/89 (08-19-22 @ 00:26) (142/87 - 150/88)  RR: 17 (08-18-22 @ 21:46) (17 - 17)    Heart: RRR  Lungs: CTA  Abdomen: Gravid, soft, NT    NST: Reactive with moderate variability, Category 1 tracing  [place]: Irregular contractions  VE: 1/20/-3, intact membranes  TAS: Cephalic presentation, YAMILET: 2

## 2022-08-19 NOTE — DISCHARGE NOTE OB - MATERIALS PROVIDED
[facility] [place] Screening Program/[place]  Immunization Record/Breastfeeding Log/Breastfeeding Mother’s Support Group Information/Guide to Postpartum Care/[facility] Hearing Screen Program/Back To Sleep Handout/Shaken Baby Prevention Handout/Breastfeeding Guide and Packet/Birth Certificate Instructions/Discharge Medication Information for Patients and Families Pocket Guide

## 2022-08-19 NOTE — DISCHARGE NOTE OB - CARE PROVIDER_API CALL
Elidia Rahman)  Obstetrics and Gynecology  [address], [address]  [place] [address]  Phone: [phone]  Fax: [phone]  Follow Up Time:

## 2022-08-19 NOTE — OB RN DELIVERY SUMMARY - NS_LABORCHARACTER_OBGYN_ALL_OB
Induction of labor-Medicinal/Augmentation of labor/Internal electronic FM/External electronic FM/Antibiotics in labor/Meconium staining/Fetal intolerance

## 2022-08-19 NOTE — DISCHARGE NOTE OB - HOSPITAL COURSE
Admitted with oligohydramnios.    Induction of labor.  Met criteria for pre-eclampsia.    Cat II FHT.  Meconium stained fluid.    24, viable male infant.

## 2022-08-20 RX ORDER — IBUPROFEN 200 MG
600 TABLET ORAL EVERY 6 HOURS
Refills: 0 | Status: DISCONTINUED | OUTPATIENT
Start: 2022-08-20 | End: 2022-08-21

## 2022-08-20 RX ADMIN — SODIUM CHLORIDE 3 MILLILITER(S): 9 INJECTION INTRAMUSCULAR; INTRAVENOUS; SUBCUTANEOUS at 05:58

## 2022-08-20 RX ADMIN — Medication 1 TABLET(S): at 12:51

## 2022-08-20 RX ADMIN — Medication 600 MILLIGRAM(S): at 12:51

## 2022-08-20 RX ADMIN — Medication 975 MILLIGRAM(S): at 09:00

## 2022-08-20 RX ADMIN — Medication 975 MILLIGRAM(S): at 00:20

## 2022-08-20 RX ADMIN — Medication 975 MILLIGRAM(S): at 08:02

## 2022-08-20 RX ADMIN — Medication 975 MILLIGRAM(S): at 15:28

## 2022-08-20 RX ADMIN — SODIUM CHLORIDE 3 MILLILITER(S): 9 INJECTION INTRAMUSCULAR; INTRAVENOUS; SUBCUTANEOUS at 16:09

## 2022-08-20 RX ADMIN — Medication 975 MILLIGRAM(S): at 16:25

## 2022-08-20 RX ADMIN — Medication 600 MILLIGRAM(S): at 13:50

## 2022-08-20 RX ADMIN — Medication 600 MILLIGRAM(S): at 19:02

## 2022-08-20 RX ADMIN — Medication 975 MILLIGRAM(S): at 22:35

## 2022-08-20 RX ADMIN — Medication 975 MILLIGRAM(S): at 21:35

## 2022-08-20 RX ADMIN — Medication 975 MILLIGRAM(S): at 01:15

## 2022-08-20 NOTE — LACTATION INITIAL EVALUATION - LACTATION INTERVENTIONS
Primary RN made aware of consult and plan./initiate/review safe skin-to-skin/initiate/review hand expression/initiate/review techniques for position and latch/post discharge community resources provided/review techniques to increase milk supply/review techniques to manage sore nipples/engorgement/initiate/review breast massage/compression/reviewed components of an effective feeding and at least 8 effective feedings per day required/reviewed importance of monitoring infant diapers, the breastfeeding log, and minimum output each day/reviewed benefits and recommendations for rooming in/reviewed feeding on demand/by cue at least 8 times a day

## 2022-08-20 NOTE — PROGRESS NOTE ADULT - SUBJECTIVE AND OBJECTIVE BOX
S: Patient doing well. Minimal lochia. Pain controlled.    O: Vital Signs Last 24 Hrs  T(C): 36.6 (20 Aug 2022 05:48), Max: 37.1 (20 Aug 2022 01:21)  T(F): 97.9 (20 Aug 2022 05:48), Max: 98.7 (20 Aug 2022 01:21)  HR: 81 (20 Aug 2022 05:48) (73 - 136)  BP: 124/79 (20 Aug 2022 05:48) (104/68 - 212/77)  BP(mean): --  RR: 18 (20 Aug 2022 05:48) (18 - 18)  SpO2: 100% (20 Aug 2022 05:48) (87% - 100%)    Parameters below as of 19 Aug 2022 22:48  Patient On (Oxygen Delivery Method): room air        Gen: NAD  Abd: soft, NT, ND, fundus firm below umbilicus  Lochia: moderate  Ext: no tenderness    Labs:                        12.3   10.80 )-----------( 298      ( 19 Aug 2022 01:25 )             38.9       A: 32y PPD#1 s/p  doing well.    Plan: patient stable for and desires early discharge  SOCORRO Fernando

## 2022-08-21 VITALS
TEMPERATURE: 98 F | HEART RATE: 84 BPM | DIASTOLIC BLOOD PRESSURE: 78 MMHG | RESPIRATION RATE: 17 BRPM | OXYGEN SATURATION: 100 % | SYSTOLIC BLOOD PRESSURE: 129 MMHG

## 2022-08-21 RX ADMIN — Medication 600 MILLIGRAM(S): at 06:10

## 2022-08-21 RX ADMIN — Medication 600 MILLIGRAM(S): at 05:11

## 2022-08-21 RX ADMIN — Medication 600 MILLIGRAM(S): at 13:35

## 2022-08-21 RX ADMIN — Medication 600 MILLIGRAM(S): at 01:06

## 2022-08-21 RX ADMIN — Medication 600 MILLIGRAM(S): at 12:58

## 2022-08-21 RX ADMIN — Medication 600 MILLIGRAM(S): at 02:00

## 2022-08-21 RX ADMIN — Medication 975 MILLIGRAM(S): at 10:20

## 2022-08-21 RX ADMIN — Medication 975 MILLIGRAM(S): at 09:26

## 2022-08-21 RX ADMIN — Medication 1 TABLET(S): at 12:58

## 2022-08-21 RX ADMIN — Medication 975 MILLIGRAM(S): at 16:33

## 2022-11-23 ENCOUNTER — EMERGENCY (EMERGENCY)
Facility: HOSPITAL | Age: 32
LOS: 1 days | Discharge: HOME | End: 2022-11-23
Attending: EMERGENCY MEDICINE | Admitting: EMERGENCY MEDICINE

## 2022-11-23 VITALS
RESPIRATION RATE: 14 BRPM | DIASTOLIC BLOOD PRESSURE: 80 MMHG | SYSTOLIC BLOOD PRESSURE: 128 MMHG | OXYGEN SATURATION: 100 % | HEART RATE: 82 BPM | TEMPERATURE: 98 F

## 2022-11-23 VITALS
RESPIRATION RATE: 18 BRPM | HEART RATE: 82 BPM | DIASTOLIC BLOOD PRESSURE: 88 MMHG | OXYGEN SATURATION: 100 % | SYSTOLIC BLOOD PRESSURE: 129 MMHG | TEMPERATURE: 99 F

## 2022-11-23 DIAGNOSIS — Z98.890 OTHER SPECIFIED POSTPROCEDURAL STATES: Chronic | ICD-10-CM

## 2022-11-23 LAB
ALBUMIN SERPL ELPH-MCNC: 4.4 G/DL — SIGNIFICANT CHANGE UP (ref 3.3–5)
ALP SERPL-CCNC: 87 U/L — SIGNIFICANT CHANGE UP (ref 40–120)
ALT FLD-CCNC: 16 U/L — SIGNIFICANT CHANGE UP (ref 4–33)
ANION GAP SERPL CALC-SCNC: 13 MMOL/L — SIGNIFICANT CHANGE UP (ref 7–14)
APPEARANCE UR: CLEAR — SIGNIFICANT CHANGE UP
AST SERPL-CCNC: 15 U/L — SIGNIFICANT CHANGE UP (ref 4–32)
BILIRUB SERPL-MCNC: <0.2 MG/DL — SIGNIFICANT CHANGE UP (ref 0.2–1.2)
BILIRUB UR-MCNC: NEGATIVE — SIGNIFICANT CHANGE UP
BUN SERPL-MCNC: 13 MG/DL — SIGNIFICANT CHANGE UP (ref 7–23)
CALCIUM SERPL-MCNC: 8.9 MG/DL — SIGNIFICANT CHANGE UP (ref 8.4–10.5)
CHLORIDE SERPL-SCNC: 106 MMOL/L — SIGNIFICANT CHANGE UP (ref 98–107)
CO2 SERPL-SCNC: 19 MMOL/L — LOW (ref 22–31)
COLOR SPEC: COLORLESS — SIGNIFICANT CHANGE UP
CREAT SERPL-MCNC: 0.66 MG/DL — SIGNIFICANT CHANGE UP (ref 0.5–1.3)
DIFF PNL FLD: NEGATIVE — SIGNIFICANT CHANGE UP
EGFR: 121 ML/MIN/1.73M2 — SIGNIFICANT CHANGE UP
GLUCOSE SERPL-MCNC: 91 MG/DL — SIGNIFICANT CHANGE UP (ref 70–99)
GLUCOSE UR QL: NEGATIVE — SIGNIFICANT CHANGE UP
HCG SERPL-ACNC: 2692 MIU/ML — SIGNIFICANT CHANGE UP
HCT VFR BLD CALC: 36.9 % — SIGNIFICANT CHANGE UP (ref 34.5–45)
HGB BLD-MCNC: 11.4 G/DL — LOW (ref 11.5–15.5)
KETONES UR-MCNC: NEGATIVE — SIGNIFICANT CHANGE UP
LEUKOCYTE ESTERASE UR-ACNC: NEGATIVE — SIGNIFICANT CHANGE UP
MCHC RBC-ENTMCNC: 23.7 PG — LOW (ref 27–34)
MCHC RBC-ENTMCNC: 30.9 GM/DL — LOW (ref 32–36)
MCV RBC AUTO: 76.6 FL — LOW (ref 80–100)
NITRITE UR-MCNC: NEGATIVE — SIGNIFICANT CHANGE UP
NRBC # BLD: 0 /100 WBCS — SIGNIFICANT CHANGE UP (ref 0–0)
NRBC # FLD: 0 K/UL — SIGNIFICANT CHANGE UP (ref 0–0)
PH UR: 6.5 — SIGNIFICANT CHANGE UP (ref 5–8)
PLATELET # BLD AUTO: 380 K/UL — SIGNIFICANT CHANGE UP (ref 150–400)
POTASSIUM SERPL-MCNC: 4.2 MMOL/L — SIGNIFICANT CHANGE UP (ref 3.5–5.3)
POTASSIUM SERPL-SCNC: 4.2 MMOL/L — SIGNIFICANT CHANGE UP (ref 3.5–5.3)
PROT SERPL-MCNC: 7.5 G/DL — SIGNIFICANT CHANGE UP (ref 6–8.3)
PROT UR-MCNC: NEGATIVE — SIGNIFICANT CHANGE UP
RBC # BLD: 4.82 M/UL — SIGNIFICANT CHANGE UP (ref 3.8–5.2)
RBC # FLD: 15.9 % — HIGH (ref 10.3–14.5)
RBC CASTS # UR COMP ASSIST: SIGNIFICANT CHANGE UP /HPF (ref 0–4)
SODIUM SERPL-SCNC: 138 MMOL/L — SIGNIFICANT CHANGE UP (ref 135–145)
SP GR SPEC: 1.01 — SIGNIFICANT CHANGE UP (ref 1.01–1.05)
UROBILINOGEN FLD QL: SIGNIFICANT CHANGE UP
WBC # BLD: 8.74 K/UL — SIGNIFICANT CHANGE UP (ref 3.8–10.5)
WBC # FLD AUTO: 8.74 K/UL — SIGNIFICANT CHANGE UP (ref 3.8–10.5)
WBC UR QL: SIGNIFICANT CHANGE UP /HPF (ref 0–5)

## 2022-11-23 PROCEDURE — 99285 EMERGENCY DEPT VISIT HI MDM: CPT

## 2022-11-23 PROCEDURE — 76830 TRANSVAGINAL US NON-OB: CPT | Mod: 26

## 2022-11-23 RX ORDER — METHOTREXATE 2.5 MG/1
87.5 TABLET ORAL ONCE
Refills: 0 | Status: COMPLETED | OUTPATIENT
Start: 2022-11-23 | End: 2022-11-23

## 2022-11-23 RX ADMIN — METHOTREXATE 87.5 MILLIGRAM(S): 2.5 TABLET ORAL at 19:51

## 2022-11-23 NOTE — ED PROVIDER NOTE - PROGRESS NOTE DETAILS
Rads called to inform me of R tubal preg.  No hemoperitoneum.  OB/GYN paged. MTX given and pt observed for 30 minutes after.

## 2022-11-23 NOTE — ED PROVIDER NOTE - CLINICAL SUMMARY MEDICAL DECISION MAKING FREE TEXT BOX
Impression pregnancy of unknown location versus ectopic.  Clinically well no evidence of ectopic at this time.  Plan: CBC, hCG, transvaginal ultrasound.  Type and screen on file from delivery August 19 shows blood type is B+.

## 2022-11-23 NOTE — ED ADULT TRIAGE NOTE - CHIEF COMPLAINT QUOTE
pt sent in by obgyn for r/o ectopic pregnancy. as per pt , md states they do not see anything in the uterus + pregnancy test in office,   LMP 10/11. denies n/v/pain/ bleeding

## 2022-11-23 NOTE — CONSULT NOTE ADULT - SUBJECTIVE AND OBJECTIVE BOX
*INCOMPLETE NOTE*    CLAUDETTE BERNARD  32y  Female [id number]    HPI:   RG is a 32F  LMP 10/10 @6.1wks GA presenting from her private obgyn office for further workup of possible ectopic pregnancy. Pt noticed pink discharge on toilet tissue when wiping last week, was subsequently seen in her obgyn's office on Friday & diagnosed with a yeast infection. Pt also incidentally found to be pregnant w/ +uHCG, bHCG at that time was 407, increased to 2690 today. TVUS performed outpt today showed no IUP, but potential ectopic R adnexa/tubal. Pt was sent in for further evaluation & management. Pt denies fever, chills, N/V, abdominal pain, vaginal bleeding, vaginal discharge. This is an unplanned pregnancy.    Name of GYN Physician: Irina Washington    OBHx: FT     GynHx:  LMP 10/10. No Hx fibroids, cysts, abnormal bleeding, pelvic problems, PCOS. Denies vaginal discharge. Periods regular every month, menses lasting 5 days using 1-2 pads per day w/ associated cramps and back pain. Denies abnormal pap smears, breast exams.  PMH: denies  PSH: BL breast reduction  Meds: fluconazole   Allx: NKDA  Social History:  Denies smoking use, drug use   +occasional social alcohol use    Vital Signs Last 24 Hrs  T(C): 36.7 (2022 10:59), Max: 36.7 (2022 10:59)  T(F): 98 (2022 10:59), Max: 98 (2022 10:59)  HR: 82 (2022 10:59) (82 - 82)  BP: 128/80 (2022 10:59) (128/80 - 128/80)  BP(mean): --  RR: 14 (2022 10:59) (14 - 14)  SpO2: 100% (2022 10:59) (100% - 100%)    Parameters below as of 2022 10:59  Patient On (Oxygen Delivery Method): room air      Physical Exam:   General: sitting comfortably in bed, NAD   HEENT: neck supple, full ROM  CV: RRR  Lungs: CTA b/l, good air flow b/l   Back: No CVA tenderness  Abd: Soft, non-tender, non-distended.  Bowel sounds present.    :  No bleeding on pad.    External labia wnl.  Bimanual exam with no cervical motion tenderness, uterus wnl, adnexa non palpable b/l.  Cervix closed vs. Cervix dilated  Speculum Exam: No active bleeding from os.  Physiologic discharge.    Ext: non-tender b/l, no edema     LABS:                        11.4   8.74  )-----------( 380      ( 2022 12:15 )             36.9         138  |  106  |  13  ----------------------------<  91  4.2   |  19<L>  |  0.66    Ca    8.9      2022 12:15    TPro  7.5  /  Alb  4.4  /  TBili  <0.2  /  DBili  x   /  AST  15  /  ALT  16  /  AlkPhos  87      I&O's Detail      Urinalysis Basic - ( 2022 12:15 )    Color: Colorless / Appearance: Clear / S.010 / pH: x  Gluc: x / Ketone: Negative  / Bili: Negative / Urobili: <2 mg/dL   Blood: x / Protein: Negative / Nitrite: Negative   Leuk Esterase: Negative / RBC: NA /HPF / WBC NA /HPF   Sq Epi: x / Non Sq Epi: x / Bacteria: x        RADIOLOGY & ADDITIONAL STUDIES:    < from: US Transvaginal (22 @ 13:54) >    ACC: [id number] EXAM:  US TRANSVAGINAL                          PROCEDURE DATE:  2022          INTERPRETATION:  CLINICAL INFORMATION: Pregnant patient with a pregnancy   of unknown location. Beta-hCG on 2022 was 1518.    LMP: 10/11/2022.    COMPARISON: None available.    TECHNIQUE:  Endovaginal pelvic sonogram only. Color and Spectral Doppler was   performed.    FINDINGS:  Uterus: 7.7 cm x 4.1 cm x 5.0 cm. Within normal limits.  Endometrium: 10 mm. Within normal limits.    Right ovary: 2.7 cm x 1.3 cm x 2.3 cm. Within normal limits. Normal   arterial and venous waveforms.  In the right adnexa, a 1.4 cm echogenic structure with a gestational sac   is identified. A fetal pole of 2 mm corresponding to 5 weeks, 6 days, and   yolk sac are also present. No fetal heart rate.  Left ovary: 2.9 cm x 2.1 cm x 2.7 cm. Corpus luteal cyst of 1.8 cm.Normal   arterial and venous waveforms.    Fluid: None.    IMPRESSION:  Right tubal ectopic pregnancy.    Findings discussed with DR. JOSÉ PALMA on 2022 2:00 PM with read   back.    --- End of Report ---        MARIA ANTONIA BOTELLO MD; Attending Radiologist  This document has been electronically signed. 2022  2:06PM    < end of copied text >             CLAUDETTE BERNARD  32y  Female [id number]    HPI:   RG is a 32F  LMP 10/10 @6.1wks GA presenting from her private obgyn office for further workup of possible ectopic pregnancy. Pt noticed pink discharge on toilet tissue when wiping last week, was subsequently seen in her obgyn's office on Friday & diagnosed with a yeast infection. Pt also incidentally found to be pregnant w/ +uHCG, bHCG at that time was 407, increased to 2690 today. TVUS performed outpt today showed no IUP, but potential ectopic R adnexa/tubal. Pt was sent in for further evaluation & management. Pt denies fever, chills, N/V, abdominal pain, vaginal bleeding, vaginal discharge. This is an unplanned pregnancy.    Name of GYN Physician: Irina Washington    OBHx: FT     GynHx:  LMP 10/10. No Hx fibroids, cysts, abnormal bleeding, pelvic problems, PCOS. Denies vaginal discharge. Periods regular every month, menses lasting 5 days using 1-2 pads per day w/ associated cramps and back pain. Denies abnormal pap smears, breast exams.  PMH: denies  PSH: BL breast reduction  Meds: fluconazole   Allx: NKDA  Social History:  Denies smoking use, drug use   +occasional social alcohol use    Vital Signs Last 24 Hrs  T(C): 36.7 (2022 10:59), Max: 36.7 (2022 10:59)  T(F): 98 (2022 10:59), Max: 98 (2022 10:59)  HR: 82 (2022 10:59) (82 - 82)  BP: 128/80 (2022 10:59) (128/80 - 128/80)  BP(mean): --  RR: 14 (2022 10:59) (14 - 14)  SpO2: 100% (2022 10:59) (100% - 100%)    Parameters below as of 2022 10:59  Patient On (Oxygen Delivery Method): room air      Physical Exam:   General: sitting comfortably in bed, NAD   CV: RRR  Lungs: CTA b/l, good air flow b/l   Abd: Soft, non-tender, non-distended.      :  No bleeding on pad.    External labia wnl.  Bimanual exam with no cervical motion tenderness, uterus wnl, adnexa non palpable b/l.  Cervix closed  Speculum Exam: No active bleeding from os.  Physiologic discharge.    Ext: non-tender b/l, no edema     Chaperone present for pelvic exam: Claudette Mcneil RN    LABS:                        11.4   8.74  )-----------( 380      ( 2022 12:15 )             36.9         138  |  106  |  13  ----------------------------<  91  4.2   |  19<L>  |  0.66    Ca    8.9      2022 12:15    TPro  7.5  /  Alb  4.4  /  TBili  <0.2  /  DBili  x   /  AST  15  /  ALT  16  /  AlkPhos  87      I&O's Detail      Urinalysis Basic - ( 2022 12:15 )    Color: Colorless / Appearance: Clear / S.010 / pH: x  Gluc: x / Ketone: Negative  / Bili: Negative / Urobili: <2 mg/dL   Blood: x / Protein: Negative / Nitrite: Negative   Leuk Esterase: Negative / RBC: NA /HPF / WBC NA /HPF   Sq Epi: x / Non Sq Epi: x / Bacteria: x        RADIOLOGY & ADDITIONAL STUDIES:    < from: US Transvaginal (22 @ 13:54) >    ACC: [id number] EXAM:  US TRANSVAGINAL                          PROCEDURE DATE:  2022          INTERPRETATION:  CLINICAL INFORMATION: Pregnant patient with a pregnancy   of unknown location. Beta-hCG on 2022 was 1518.    LMP: 10/11/2022.    COMPARISON: None available.    TECHNIQUE:  Endovaginal pelvic sonogram only. Color and Spectral Doppler was   performed.    FINDINGS:  Uterus: 7.7 cm x 4.1 cm x 5.0 cm. Within normal limits.  Endometrium: 10 mm. Within normal limits.    Right ovary: 2.7 cm x 1.3 cm x 2.3 cm. Within normal limits. Normal   arterial and venous waveforms.  In the right adnexa, a 1.4 cm echogenic structure with a gestational sac   is identified. A fetal pole of 2 mm corresponding to 5 weeks, 6 days, and   yolk sac are also present. No fetal heart rate.  Left ovary: 2.9 cm x 2.1 cm x 2.7 cm. Corpus luteal cyst of 1.8 cm.Normal   arterial and venous waveforms.    Fluid: None.    IMPRESSION:  Right tubal ectopic pregnancy.    Findings discussed with DR. JOSÉ PALMA on 2022 2:00 PM with read   back.    --- End of Report ---        MARIA ANTONIA BOTELLO MD; Attending Radiologist  This document has been electronically signed. 2022  2:06PM    < end of copied text >

## 2022-11-23 NOTE — ED PROVIDER NOTE - PATIENT PORTAL LINK FT
You can access the FollowMyHealth Patient Portal offered by [facility] by registering at the following website: [email or web address]. By joining [facility] FollowMyHealth portal, you will also be able to view your health information using other applications (apps) compatible with our system.

## 2022-11-23 NOTE — CONSULT NOTE ADULT - ASSESSMENT
RG is a 32F  LMP 10/10 @6.1wks GA presents with tubal ectopic pregnancy. Pt is stable, asymptomatic at this time.    -Would recommend methotrexate therapy for treatment of ectopic pregnancy.    -Patient counseled on methotrexate therapy as treatment for ectopic pregnancy.  Common side effects of the medication as well as restictions while on the medication were reviewed with patient and patient signed methotrexate information sheet that was placed in her chart.    -Reviewed with patient the 15-20% methotrexate failure rate and possibility of neccesity for additional dose of methotrexate.   -Consents for methotrexate signed with patient with attending at bedside.    -Chemotherapy drug order form sent  to pharmacy with methotrexate dose calculated based on BSA for patient.   -Patient given strict precautions to call her physican or return to ED if she experiences any severe adbominal pain, dizziness, lightheadness, severe n/v, or any heavy vaginal bleeding >2 pads/hour for >2 hours.    -Patient instructed on need for follow up of b-hcg on day 4 and day 7 of methotrexate therapy.  Patient intends to follow up in the ED vs. at her private OBGYN.    -Once patient recieves methotrexate therapy she is cleared for discharge from OBGYN perspective.  Primary managment per ED team.     Nahun, PGY3

## 2022-11-23 NOTE — ED PROVIDER NOTE - NS ED ATTENDING STATEMENT MOD
24 y/o with no PMH presenting to ED for witnessed seizure this AM. Pt states "The teacher said I was shaking for 10-15 seconds and then I got back to normal. I don't remember what happened but I was fine afterwards. I was able to walk. I felt hot and sweaty right before I went down. They said I didn't hit my head." Upon exam pt A&Ox3 gross neuro intact, 3 mm PERRLA, bilateral  strength equal, no facial droop, no arm drift/ leg drop noted, 5/5 strength in all extremities, no difficulty speaking in complete sentences, pt NAD at this time, acting appropriately for age. Pt denies chest pain, sob, ha, n/v/d, abdominal pain, f/c, urinary symptoms, hematuria Attending Only

## 2022-11-23 NOTE — ED ADULT NURSE NOTE - NSIMPLEMENTINTERV_GEN_ALL_ED
Implemented All Universal Safety Interventions:  [employer] to call system. Call bell, personal items and telephone within reach. Instruct patient to call for assistance. Room bathroom lighting operational. Non-slip footwear when patient is off stretcher. Physically safe environment: no spills, clutter or unnecessary equipment. Stretcher in lowest position, wheels locked, appropriate side rails in place.

## 2022-11-23 NOTE — ED PROVIDER NOTE - NSFOLLOWUPINSTRUCTIONS_ED_ALL_ED_FT
You were treated in the ED for an ectopic (tubal pregnancy).  You received a dose of Methotrexate.    You need repeat blood work on 11/26 and 11/29. If possible, please arrange with your OB office (they will contact you for outpatient blood work.)  If you are unable to get blood work on these days thru them please return to ED for the blood work.    Please return to ED for any sudden severe abdominal pain, lightheadedness, passing out, heavy bleeding or other concerns.

## 2022-11-23 NOTE — CONSULT NOTE ADULT - ATTENDING COMMENTS
OB Attending Note    Patient seen and evaluated at bedside.  Reviewed MTX precautions as above.   MTX ordered.  Patient aware of need for f/u w/ primary OBGYN on Saturday.     I personally spoke with patient's OBGYN Dr. Tai ([facility]) who states he will have office call patient Friday for f/u.  All questions/concerns of patient and family at bedside addressed.     JUDIE Landrum MD

## 2022-11-23 NOTE — ED PROVIDER NOTE - OBJECTIVE STATEMENT
32-year-old female , unknown gestational age, serum hCG on 2022 = 1500s.  Sent to ED by OB/GYN for repeat hCG and repeat ultrasound to evaluate pregnancy of unknown location rule out ectopic.  Associated symptoms: Patient has no abdominal pain, no cramping, no vaginal bleeding no vaginal discharge no dysuria.  Patient had a vaginal delivery in August; uneventful pregnancy.

## 2022-11-25 LAB
CULTURE RESULTS: SIGNIFICANT CHANGE UP
SPECIMEN SOURCE: SIGNIFICANT CHANGE UP

## 2022-11-26 NOTE — CHART NOTE - NSCHARTNOTEFT_GEN_A_CORE
Patient called to discuss need for day 4 MTX   Plan previously for f/u with Dr. Tai today for day 4 MTX   No answer on phone call today. Left VM reminding patient about apt and informed her to come into [facility] ED if unable to make appt. Call back number left    MAGGIE Esparza, PGY-2 Patient called to discuss need for day 4 bhCG on MTX therapy for tubal ectopic pregnancy   Plan previously for f/u with Dr. Tai today for day 4 bhCG  No answer on phone call today. Left VM reminding patient about apt and informed her to come into [facility] ED if unable to make appt. Call back number left    MAGGIE Esparza, PGY-2

## 2022-11-30 NOTE — CHART NOTE - NSCHARTNOTEFT_GEN_A_CORE
Patient was called 11/29 and she confirmed that she went to her appointment with Dr. Tai. She had an ultrasound that showed the ectopic was the same size. She was given a second dose of methotrexate with him and will return to his office on 12/2 to get repeat bHCG and ultrasound. Given that patient has follow up with private OBGYN she will be taken off beta list.    d/w Dr. Aden Landry, PGY2

## 2023-04-12 ENCOUNTER — RESULT REVIEW (OUTPATIENT)
Age: 33
End: 2023-04-12

## 2023-05-09 NOTE — OB RN PATIENT PROFILE - FUNCTIONAL SCREEN CURRENT LEVEL: COMMUNICATION, MLM
For information on Fall & Injury Prevention, visit: [email or web address] OR  [email or web address] OR  https://www.cdc.gov/steadi/patient.html 0 = understands/communicates without difficulty

## 2023-05-16 ENCOUNTER — APPOINTMENT (OUTPATIENT)
Dept: ANTEPARTUM | Facility: CLINIC | Age: 33
End: 2023-05-16
Payer: COMMERCIAL

## 2023-05-16 ENCOUNTER — APPOINTMENT (OUTPATIENT)
Dept: OBGYN | Facility: CLINIC | Age: 33
End: 2023-05-16
Payer: COMMERCIAL

## 2023-05-16 VITALS
DIASTOLIC BLOOD PRESSURE: 73 MMHG | HEIGHT: 60 IN | BODY MASS INDEX: 32.2 KG/M2 | SYSTOLIC BLOOD PRESSURE: 116 MMHG | WEIGHT: 164 LBS

## 2023-05-16 DIAGNOSIS — Z36.82 ENCOUNTER FOR ANTENATAL SCREENING FOR NUCHAL TRANSLUCENCY: ICD-10-CM

## 2023-05-16 PROCEDURE — 99203 OFFICE O/P NEW LOW 30 MIN: CPT

## 2023-05-16 PROCEDURE — 76801 OB US < 14 WKS SINGLE FETUS: CPT

## 2023-05-16 PROCEDURE — 36415 COLL VENOUS BLD VENIPUNCTURE: CPT

## 2023-05-16 PROCEDURE — 76813 OB US NUCHAL MEAS 1 GEST: CPT

## 2023-05-16 NOTE — DISCUSSION/SUMMARY
[FreeTextEntry1] : The significance of nuchal translucency testing was explained to the patient and ultrasound was performed. The sensitivity of the test can be improved by combining with second trimester quad screening. This type of sequential testing minimally increases the false positive rate, but the detection rate for Down syndrome is increased. If the sequential testing is desired, a second stage quad should be drawn and sent. As an alternative, this can be done in our office. If the patient does not desire sequential testing, then a single marker for AFP may be sent to any lab after 15 completed weeks gestation.\par \par Prenatal diagnostic testing is clinically indicated for this patient. The limitations of NIPT testing were discussed with the patient and amniocentesis was noted to remain the gold standard for prenatal diagnostic testing. The significance of NIPT testing was reviewed which she has already done. NT Blood was drawn and the results will be sent to your office in approximately 2 weeks. Sequential screening is recommended between 15-16 weeks. Anatomy scan is recommended at 19-20 weeks.

## 2023-05-16 NOTE — HISTORY OF PRESENT ILLNESS
[FreeTextEntry1] : Patient is a 33 year old presenting at 12w1d seen in my office today for nuchal translucency testing. GENO 11/27/23. The limitations of testing were discussed with the patient and she was informed that this is a screening test. If she desires a diagnostic test like CVS or Amniocentesis, this may be performed.

## 2023-05-21 LAB
ADDITIONAL US: NORMAL
CRL SCAN TWIN B: NORMAL
CRL SCAN: NORMAL
CROWN RUMP LENGTH TWIN B: NORMAL
CROWN RUMP LENGTH: 56.6 MM
DIA MOM: 1.31
DIA VALUE: 307.9 PG/ML
DOWN SYNDROME AGE RISK: NORMAL
DOWN SYNDROME INTERPRETATION: NORMAL
DOWN SYNDROME SCREENING RISK: NORMAL
FIRST TRIMESTER SCREEN COMMENTS: NORMAL
FIRST TRIMESTER SCREEN NOTE: NORMAL
FIRST TRIMESTER SCREEN RESULTS: NORMAL
FIRST TRIMESTER SCREEN TEST RESULTS: NORMAL
GEST. AGE ON COLLECTION DATE: 12 WEEKS
HCG MOM: 1.22
HCG VALUE: 122.8 IU/ML
MATERNAL AGE AT EDD: 31.8 YR
NT MOM TWIN B: NORMAL
NT TWIN B: NORMAL
NUCHAL TRANSLUCENCY (NT): 1.5 MM
NUCHAL TRANSLUCENCY MOM: 1.08
NUMBER OF FETUSES: 1
PAPP-A MOM: 0.73
PAPP-A VALUE: 549.9 NG/ML
RACE: NORMAL
SONOGRAPHER ID#: NORMAL
TRISOMY 18 AGE RISK: NORMAL
TRISOMY 18 INTERPRETATION: NORMAL
TRISOMY 18 SCREENING RISK: NORMAL
WEIGHT AFP: 164 LBS

## 2023-06-07 NOTE — ED PROVIDER NOTE - CARE PLAN
Subjective:     Postpartum Day 1 from a  delivery.    The patient feels well.   The patient denies emotional concerns.  Pain is well controlled with current medications.   Baby is feeding via breast.   The patient is ambulating well. The patient is tolerating a normal diet.   Patient is passing flatus.  Voiding normally.   Vaginal Bleeding is mild    Objective:      Patient Vitals for the past 8 hrs:   BP Temp Temp src Pulse Resp SpO2   23 1400 -- -- -- -- 18 98 %   23 1300 -- -- -- -- 18 99 %   23 1200 121/84 98.8 °F (37.1 °C) Oral 95 18 96 %   23 1100 -- -- -- -- 18 97 %   23 1000 -- -- -- -- 18 98 %   23 0900 -- -- -- -- 18 97 %   23 0800 125/84 98.2 °F (36.8 °C) Oral 86 18 98 %     Gen: NAD  Abd: Soft, nontender, no rebound or guarding, uterus firm  Dressing: clean, dry, intact, steris in place  Ext: no calf tenderness or edema  Lochia: Normal    LABS:   Recent Results (from the past 48 hour(s))   GLUCOSE, BEDSIDE - POINT OF CARE    Collection Time: 23  8:59 PM   Result Value Ref Range    GLUCOSE, BEDSIDE - POINT OF CARE 82 70 - 99 mg/dL   TYPE/SCREEN    Collection Time: 23  9:14 PM   Result Value Ref Range    ABO/RH(D) B Rh Negative     ANTIBODY SCREEN Positive     TYPE AND SCREEN EXPIRATION DATE 2023 23:59    CBC No Differential    Collection Time: 23  9:14 PM   Result Value Ref Range    WBC 8.4 4.2 - 11.0 K/mcL    RBC 3.93 (L) 4.00 - 5.20 mil/mcL    HGB 10.2 (L) 12.0 - 15.5 g/dL    HCT 31.9 (L) 36.0 - 46.5 %    MCV 81.2 78.0 - 100.0 fl    MCH 26.0 26.0 - 34.0 pg    MCHC 32.0 32.0 - 36.5 g/dL     140 - 450 K/mcL    RDW-CV 14.9 11.0 - 15.0 %    RDW-SD 41.9 39.0 - 50.0 fL    NRBC 0 <=0 /100 WBC   Antibody ID    Collection Time: 23  9:14 PM   Result Value Ref Range    ANTIBODY ID Anti-D due to RhIG    Creatinine    Collection Time: 23 10:19 PM   Result Value Ref Range    Creatinine 0.78 0.51 - 0.95 mg/dL    Glomerular  Filtration Rate >90 >=60   SGOT    Collection Time: 06/05/23 10:19 PM   Result Value Ref Range    GOT/AST 28 <=37 Units/L   SGPT    Collection Time: 06/05/23 10:19 PM   Result Value Ref Range    GPT/ALT 28 <64 Units/L   Lactate Dehydrogenase    Collection Time: 06/05/23 10:19 PM   Result Value Ref Range    LD, Total 239 82 - 240 Units/L   CBC No Differential    Collection Time: 06/05/23 10:19 PM   Result Value Ref Range    WBC 8.1 4.2 - 11.0 K/mcL    RBC 3.77 (L) 4.00 - 5.20 mil/mcL    HGB 9.8 (L) 12.0 - 15.5 g/dL    HCT 30.6 (L) 36.0 - 46.5 %    MCV 81.2 78.0 - 100.0 fl    MCH 26.0 26.0 - 34.0 pg    MCHC 32.0 32.0 - 36.5 g/dL     140 - 450 K/mcL    RDW-CV 15.0 11.0 - 15.0 %    RDW-SD 41.1 39.0 - 50.0 fL    NRBC 0 <=0 /100 WBC   Protein/Creatinine Ratio, Urine    Collection Time: 06/05/23 10:54 PM   Result Value Ref Range    Protein, Urine 8 <12 mg/dL    Creatinine, Urine 30.30 mg/dL    Protein/ Creatinine Ratio 264 (H) <=199 mgPR/gCR   GLUCOSE, BEDSIDE - POINT OF CARE    Collection Time: 06/06/23  5:47 AM   Result Value Ref Range    GLUCOSE, BEDSIDE - POINT OF CARE 92 70 - 99 mg/dL   BLOOD GAS, VENOUS CORD BLOOD - RESPIRATORY    Collection Time: 06/06/23  9:41 PM   Result Value Ref Range    BASE EXCESS / DEFICIT, VENOUS CORD BLOOD - RESPIRATORY -3 mmol/L    HCO3, VENOUS CORD BLOOD - RESPIRATORY 22 22 - 29 mmol/L    PCO2, VENOUS CORD BLOOD - RESPIRATORY 40 23 - 49 mm Hg    PH, VENOUS CORD BLOOD - RESPIRATORY 7.35 7.25 - 7.45 Units    PO2, VENOUS CORD BLOOD - RESPIRATORY 28 17 - 41 mm Hg   CBC No Differential    Collection Time: 06/07/23  6:16 AM   Result Value Ref Range    WBC 14.6 (H) 4.2 - 11.0 K/mcL    RBC 2.98 (L) 4.00 - 5.20 mil/mcL    HGB 7.7 (L) 12.0 - 15.5 g/dL    HCT 24.2 (L) 36.0 - 46.5 %    MCV 81.2 78.0 - 100.0 fl    MCH 25.8 (L) 26.0 - 34.0 pg    MCHC 31.8 (L) 32.0 - 36.5 g/dL     (L) 140 - 450 K/mcL    RDW-CV 14.9 11.0 - 15.0 %    RDW-SD 42.5 39.0 - 50.0 fL    NRBC 0 <=0 /100 WBC   RH  IMMUNE GLOBULIN WORKUP    Collection Time: 23  6:16 AM   Result Value Ref Range    ABO/RH(D) B Rh Negative     FETAL SCREEN Negative            Assessment:     Status post  section. Doing well postoperatively.         Plan:     POD# 1 s/p CS   1. Doing well  2. Hb 9.8 --> 7.7. She is asymptomatic, normal HR. Will recheck tomorrow morning.  3. C/o itching. ?duramorph vs toradol. She seems to think she has taken NSAIDs in the past without any problems. Claritin ordered  4. Will hold off on IV Fe today.   5. Ambulate  6. Abdominal binder  7. Continue postop care       Pauline Chavez MD  2023  3:18 PM   Principal Discharge DX:	Tubal ectopic pregnancy   1

## 2023-07-11 ENCOUNTER — APPOINTMENT (OUTPATIENT)
Dept: OBGYN | Facility: CLINIC | Age: 33
End: 2023-07-11
Payer: COMMERCIAL

## 2023-07-11 ENCOUNTER — APPOINTMENT (OUTPATIENT)
Dept: ANTEPARTUM | Facility: CLINIC | Age: 33
End: 2023-07-11
Payer: COMMERCIAL

## 2023-07-11 VITALS
HEIGHT: 60 IN | DIASTOLIC BLOOD PRESSURE: 84 MMHG | SYSTOLIC BLOOD PRESSURE: 129 MMHG | BODY MASS INDEX: 32.2 KG/M2 | WEIGHT: 164 LBS

## 2023-07-11 PROCEDURE — ZZZZZ: CPT

## 2023-07-11 PROCEDURE — 76811 OB US DETAILED SNGL FETUS: CPT

## 2023-07-11 NOTE — OB SUMMARY
[Date: _____] : Date: [unfilled] [Gestational Age: _____] : Gestational Age: [unfilled] [FreeTextEntry1] : pt presents to office for anatomy scan. anatomy scan done today. No gross abnormalities noted. Normal growth. Normal fluid. Normal movement. +FHR (see official sono report)\par -f/u PRN  [de-identified] : dn

## 2023-10-05 NOTE — PROVIDER CONTACT NOTE (OTHER) - NAME OF MD/NP/PA/DO NOTIFIED:
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

## 2023-11-18 NOTE — OB RN PATIENT PROFILE - AS SC BRADEN SENSORY
9:14a: Writer called [facility].    9:40a: Bobbi with [facility] called and stated she will be here in 40 mins.    (4) no impairment

## 2023-11-28 ENCOUNTER — OUTPATIENT (OUTPATIENT)
Dept: INPATIENT UNIT | Facility: HOSPITAL | Age: 33
LOS: 1 days | Discharge: HOME | End: 2023-11-28
Payer: COMMERCIAL

## 2023-11-28 VITALS — DIASTOLIC BLOOD PRESSURE: 77 MMHG | HEART RATE: 82 BPM | SYSTOLIC BLOOD PRESSURE: 124 MMHG

## 2023-11-28 VITALS
TEMPERATURE: 98 F | HEART RATE: 90 BPM | SYSTOLIC BLOOD PRESSURE: 142 MMHG | DIASTOLIC BLOOD PRESSURE: 83 MMHG | RESPIRATION RATE: 16 BRPM

## 2023-11-28 DIAGNOSIS — Z98.890 OTHER SPECIFIED POSTPROCEDURAL STATES: Chronic | ICD-10-CM

## 2023-11-28 DIAGNOSIS — O26.899 OTHER SPECIFIED PREGNANCY RELATED CONDITIONS, UNSPECIFIED TRIMESTER: ICD-10-CM

## 2023-11-28 PROCEDURE — 83986 ASSAY PH BODY FLUID NOS: CPT | Mod: QW

## 2023-11-28 PROCEDURE — 99221 1ST HOSP IP/OBS SF/LOW 40: CPT | Mod: 25

## 2023-11-28 PROCEDURE — 59025 FETAL NON-STRESS TEST: CPT | Mod: 26

## 2023-11-28 RX ORDER — ACETAMINOPHEN 500 MG
3 TABLET ORAL
Qty: 0 | Refills: 0 | DISCHARGE

## 2023-11-28 RX ORDER — IBUPROFEN 200 MG
3 TABLET ORAL
Qty: 0 | Refills: 0 | DISCHARGE

## 2023-11-28 NOTE — OB PROVIDER TRIAGE NOTE - NS_FETALMONCTXPAT_OBGYN_ALL_OB
[FreeTextEntry1] : 46 yo presents for postoperative visit s/p total laparoscopic hysterectomy, bilateral salpingectomy, cystotomy repair on 8/23.  She reports regular voiding and bowel movements, denies fevers/chills/dysuria.  Not taking pain medication. Her bladder is continue to improve. No Contractions

## 2023-11-28 NOTE — OB PROVIDER TRIAGE NOTE - HISTORY OF PRESENT ILLNESS
PNC: [place]    34y/o  at 40.1weeks presents with c/o clear leaking of fluid since 11pm. Denies vaginal bleeding. contractions, and reports positive fetal movement.    GBS negative 10/30    AP Course significant for:  -x1  22 PEC - pt unsure if she had magnesium.  -ectopic pregnancy,

## 2023-11-28 NOTE — OB PROVIDER TRIAGE NOTE - NSHPPHYSICALEXAM_GEN_ALL_CORE
Vital Signs Last 24 Hrs  T(C): 36.5 (28 Nov 2023 06:28), Max: 36.8 (28 Nov 2023 02:42)  T(F): 97.7 (28 Nov 2023 06:28), Max: 98.2 (28 Nov 2023 02:42)  HR: 82 (28 Nov 2023 07:41) (78 - 82)  BP: 124/77 (28 Nov 2023 07:41) (115/75 - 127/85)  BP(mean): --  RR: 16 (28 Nov 2023 02:42) (16 - 16)  SpO2: --    Parameters below as of 28 Nov 2023 02:42  Patient On (Oxygen Delivery Method): room air    abdomen soft, non tender  sse: negative pooling, negative nitrazine, negative fern  tas:  nst: reactive.  toco: no contractions Vital Signs Last 24 Hrs  T(C): 36.5 (28 Nov 2023 06:28), Max: 36.8 (28 Nov 2023 02:42)  T(F): 97.7 (28 Nov 2023 06:28), Max: 98.2 (28 Nov 2023 02:42)  HR: 82 (28 Nov 2023 07:41) (78 - 82)  BP: 124/77 (28 Nov 2023 07:41) (115/75 - 127/85)  BP(mean): --  RR: 16 (28 Nov 2023 02:42) (16 - 16)  SpO2: --    Parameters below as of 28 Nov 2023 02:42  Patient On (Oxygen Delivery Method): room air    abdomen soft, non tender  sse: negative pooling, negative nitrazine, negative fern  tas: images saved in asob, vertex, anterior placenta, eden 12.04cm, bpp 8/8. m-mode 126  nst: reactive.  toco: no contractions Vital Signs Last 24 Hrs  T(C): 36.5 (28 Nov 2023 06:28), Max: 36.8 (28 Nov 2023 02:42)  T(F): 97.7 (28 Nov 2023 06:28), Max: 98.2 (28 Nov 2023 02:42)  HR: 82 (28 Nov 2023 07:41) (78 - 82)  BP: 124/77 (28 Nov 2023 07:41) (115/75 - 127/85)  BP(mean): --  RR: 16 (28 Nov 2023 02:42) (16 - 16)  SpO2: --    Parameters below as of 28 Nov 2023 02:42  Patient On (Oxygen Delivery Method): room air    abdomen soft, non tender  HR reg rate, rhythm  lungs clear, equal b/l  sse: negative pooling, negative nitrazine, negative fern  tas: images saved in asob, vertex, anterior placenta, eden 12.04cm, bpp 8/8. m-mode 126  nst: reactive.  toco: no contractions

## 2023-11-28 NOTE — OB PROVIDER TRIAGE NOTE - NSOBPROVIDERNOTE_OBGYN_ALL_OB_FT
34y/o  at 40.1weeks with no evidence of ruptured membranes.  - 34y/o  at 40.1weeks with no evidence of ruptured membranes.  -nst reactive with bpp   -d/w MD Mc; pt cleared for d/c home. 34y/o  at 40.1weeks with no evidence of ruptured membranes.  -nst reactive with bpp   -d/w MD Mc; pt cleared for d/c home.        - Discussed with Dr. Mc  - Patient to be discharged home with follow up and return precautions  - Please follow up with your obstetrician at your next scheduled appointment.   - Please return for decreased/no fetal movement, vaginal bleeding similar to that of a period, leaking/gush of fluid, regular contractions.  - Patient and partner and educated of plan and demonstrate understanding. All questions answered. Discharge instructions provided and signed.   - Fetal kick counts reviewed.  - Discharged at 0820.

## 2023-11-30 DIAGNOSIS — Z87.59 PERSONAL HISTORY OF OTHER COMPLICATIONS OF PREGNANCY, CHILDBIRTH AND THE PUERPERIUM: ICD-10-CM

## 2023-11-30 DIAGNOSIS — O09.13 SUPERVISION OF PREGNANCY WITH HISTORY OF ECTOPIC PREGNANCY, THIRD TRIMESTER: ICD-10-CM

## 2023-11-30 DIAGNOSIS — O48.0 POST-TERM PREGNANCY: ICD-10-CM

## 2023-11-30 DIAGNOSIS — Z3A.40 40 WEEKS GESTATION OF PREGNANCY: ICD-10-CM

## 2023-11-30 DIAGNOSIS — Z03.71 ENCOUNTER FOR SUSPECTED PROBLEM WITH AMNIOTIC CAVITY AND MEMBRANE RULED OUT: ICD-10-CM

## 2023-12-01 ENCOUNTER — INPATIENT (INPATIENT)
Facility: HOSPITAL | Age: 33
LOS: 2 days | Discharge: HOME | End: 2023-12-04
Attending: OBSTETRICS & GYNECOLOGY | Admitting: OBSTETRICS & GYNECOLOGY

## 2023-12-01 VITALS
HEART RATE: 91 BPM | SYSTOLIC BLOOD PRESSURE: 139 MMHG | DIASTOLIC BLOOD PRESSURE: 96 MMHG | RESPIRATION RATE: 16 BRPM | TEMPERATURE: 98 F

## 2023-12-01 DIAGNOSIS — Z98.890 OTHER SPECIFIED POSTPROCEDURAL STATES: Chronic | ICD-10-CM

## 2023-12-01 DIAGNOSIS — O26.899 OTHER SPECIFIED PREGNANCY RELATED CONDITIONS, UNSPECIFIED TRIMESTER: ICD-10-CM

## 2023-12-01 RX ORDER — CHLORHEXIDINE GLUCONATE 213 G/1000ML
1 SOLUTION TOPICAL DAILY
Refills: 0 | Status: DISCONTINUED | OUTPATIENT
Start: 2023-12-01 | End: 2023-12-02

## 2023-12-01 RX ORDER — OXYTOCIN 10 UNIT/ML
333.33 VIAL (ML) INJECTION
Qty: 20 | Refills: 0 | Status: DISCONTINUED | OUTPATIENT
Start: 2023-12-01 | End: 2023-12-02

## 2023-12-01 RX ORDER — CITRIC ACID/SODIUM CITRATE 300-500 MG
15 SOLUTION, ORAL ORAL EVERY 6 HOURS
Refills: 0 | Status: DISCONTINUED | OUTPATIENT
Start: 2023-12-01 | End: 2023-12-02

## 2023-12-01 RX ORDER — SODIUM CHLORIDE 9 MG/ML
1000 INJECTION, SOLUTION INTRAVENOUS
Refills: 0 | Status: DISCONTINUED | OUTPATIENT
Start: 2023-12-01 | End: 2023-12-02

## 2023-12-01 NOTE — OB PROVIDER H&P - NSLOWPPHRISK_OBGYN_A_OB
No previous uterine incision/[place] Pregnancy/Less than or equal to 4 previous vaginal births/No known bleeding disorder/No history of postpartum hemorrhage/No other PPH risks indicated

## 2023-12-01 NOTE — OB PROVIDER H&P - ASSESSMENT
33y female  @40.4w, admit for labor.    - Admit to Labor and Delivery  - Continuous EFM  - Clear diet, IV fluids  - Consent signed  - Standard labs (T&S, CBC, RPR)  - Epidural PRN  - Expectant management  - Discussed with Dr. Whitten-Edilberto

## 2023-12-01 NOTE — OB PROVIDER H&P - NSPREVIOUSLIVEBIR_OBGYN_ALL_OB
Yes Consent: The rationale for the repair was explained to the patient and consent was obtained. The risks, benefits and alternatives to therapy were discussed in detail. Specifically, the risks of infection, scarring, bleeding, prolonged wound healing, incomplete removal, allergy to anesthesia, nerve injury and recurrence were addressed. Prior to the procedure, the treatment site was clearly identified and confirmed by the patient. All components of Universal Protocol/PAUSE Rule completed.

## 2023-12-01 NOTE — OB PROVIDER H&P - NSHPPHYSICALEXAM_GEN_ALL_CORE
T(C): 36.7 (12-01-23 @ 22:34), Max: 36.7 (12-01-23 @ 22:34)  HR: 81 (12-01-23 @ 23:21) (81 - 91)  BP: 142/70 (12-01-23 @ 23:21) (127/84 - 142/70)  RR: 16 (12-01-23 @ 22:34) (16 - 16)  SpO2: --  – PE:   CV: RRR  Pulm: breathing comfortably on RA  Abd: soft, gravid, nontender  Extr: moving all extremities with ease  TAUS: images saved in ASOB, vertex position  NST: baseline 145 FHR, moderate variability, + accels, - decels, reactive

## 2023-12-01 NOTE — OB RN PATIENT PROFILE - NS_PRENATALLABSOURCEGBS36_OBGYN_ALL_OB
hard copy, drawn during this pregnancy Information: Selecting Yes will display possible errors in your note based on the variables you have selected. This validation is only offered as a suggestion for you. PLEASE NOTE THAT THE VALIDATION TEXT WILL BE REMOVED WHEN YOU FINALIZE YOUR NOTE. IF YOU WANT TO FAX A PRELIMINARY NOTE YOU WILL NEED TO TOGGLE THIS TO 'NO' IF YOU DO NOT WANT IT IN YOUR FAXED NOTE.

## 2023-12-01 NOTE — OB RN TRIAGE NOTE - NSICDXPASTSURGICALHX_GEN_ALL_CORE_FT
PAST SURGICAL HISTORY:  H/O bilateral breast reduction surgery age 21     PAST SURGICAL HISTORY:  H/O bilateral breast reduction surgery age 21    History of D&C

## 2023-12-01 NOTE — OB PROVIDER H&P - HISTORY OF PRESENT ILLNESS
33y , EGA@ 40.4 weeks, presents for ctx q 5 minutes. Patient reports  + fetal movements,  denies leaking of fluid, denies vaginal bleeding. Pt denies any other concerns.  Antepartum course is significant for:  - GBS negative

## 2023-12-01 NOTE — OB PROVIDER H&P - NS_SONODONE_OBGYN_ALL_OB
For information on Fall & Injury Prevention, visit [email or web address]
Yes

## 2023-12-02 ENCOUNTER — TRANSCRIPTION ENCOUNTER (OUTPATIENT)
Age: 33
End: 2023-12-02

## 2023-12-02 LAB
ALBUMIN SERPL ELPH-MCNC: 3.6 G/DL — SIGNIFICANT CHANGE UP (ref 3.3–5)
ALBUMIN SERPL ELPH-MCNC: 3.6 G/DL — SIGNIFICANT CHANGE UP (ref 3.3–5)
ALP SERPL-CCNC: 282 U/L — HIGH (ref 40–120)
ALP SERPL-CCNC: 282 U/L — HIGH (ref 40–120)
ALT FLD-CCNC: 15 U/L — SIGNIFICANT CHANGE UP (ref 4–33)
ALT FLD-CCNC: 15 U/L — SIGNIFICANT CHANGE UP (ref 4–33)
ANION GAP SERPL CALC-SCNC: 11 MMOL/L — SIGNIFICANT CHANGE UP (ref 7–14)
ANION GAP SERPL CALC-SCNC: 11 MMOL/L — SIGNIFICANT CHANGE UP (ref 7–14)
APPEARANCE UR: ABNORMAL
APPEARANCE UR: ABNORMAL
APTT BLD: 25.5 SEC — SIGNIFICANT CHANGE UP (ref 24.5–35.6)
APTT BLD: 25.5 SEC — SIGNIFICANT CHANGE UP (ref 24.5–35.6)
AST SERPL-CCNC: 19 U/L — SIGNIFICANT CHANGE UP (ref 4–32)
AST SERPL-CCNC: 19 U/L — SIGNIFICANT CHANGE UP (ref 4–32)
BACTERIA # UR AUTO: ABNORMAL /HPF
BACTERIA # UR AUTO: ABNORMAL /HPF
BASOPHILS # BLD AUTO: 0.06 K/UL — SIGNIFICANT CHANGE UP (ref 0–0.2)
BASOPHILS # BLD AUTO: 0.06 K/UL — SIGNIFICANT CHANGE UP (ref 0–0.2)
BASOPHILS NFR BLD AUTO: 0.5 % — SIGNIFICANT CHANGE UP (ref 0–2)
BASOPHILS NFR BLD AUTO: 0.5 % — SIGNIFICANT CHANGE UP (ref 0–2)
BILIRUB SERPL-MCNC: 0.3 MG/DL — SIGNIFICANT CHANGE UP (ref 0.2–1.2)
BILIRUB SERPL-MCNC: 0.3 MG/DL — SIGNIFICANT CHANGE UP (ref 0.2–1.2)
BILIRUB UR-MCNC: NEGATIVE — SIGNIFICANT CHANGE UP
BILIRUB UR-MCNC: NEGATIVE — SIGNIFICANT CHANGE UP
BLD GP AB SCN SERPL QL: NEGATIVE — SIGNIFICANT CHANGE UP
BLD GP AB SCN SERPL QL: NEGATIVE — SIGNIFICANT CHANGE UP
BUN SERPL-MCNC: 9 MG/DL — SIGNIFICANT CHANGE UP (ref 7–23)
BUN SERPL-MCNC: 9 MG/DL — SIGNIFICANT CHANGE UP (ref 7–23)
CALCIUM SERPL-MCNC: 9 MG/DL — SIGNIFICANT CHANGE UP (ref 8.4–10.5)
CALCIUM SERPL-MCNC: 9 MG/DL — SIGNIFICANT CHANGE UP (ref 8.4–10.5)
CAST: 0 /LPF — SIGNIFICANT CHANGE UP (ref 0–4)
CAST: 0 /LPF — SIGNIFICANT CHANGE UP (ref 0–4)
CHLORIDE SERPL-SCNC: 102 MMOL/L — SIGNIFICANT CHANGE UP (ref 98–107)
CHLORIDE SERPL-SCNC: 102 MMOL/L — SIGNIFICANT CHANGE UP (ref 98–107)
CO2 SERPL-SCNC: 22 MMOL/L — SIGNIFICANT CHANGE UP (ref 22–31)
CO2 SERPL-SCNC: 22 MMOL/L — SIGNIFICANT CHANGE UP (ref 22–31)
COLOR SPEC: YELLOW — SIGNIFICANT CHANGE UP
COLOR SPEC: YELLOW — SIGNIFICANT CHANGE UP
CREAT ?TM UR-MCNC: 51 MG/DL — SIGNIFICANT CHANGE UP
CREAT ?TM UR-MCNC: 51 MG/DL — SIGNIFICANT CHANGE UP
CREAT SERPL-MCNC: 0.58 MG/DL — SIGNIFICANT CHANGE UP (ref 0.5–1.3)
CREAT SERPL-MCNC: 0.58 MG/DL — SIGNIFICANT CHANGE UP (ref 0.5–1.3)
DIFF PNL FLD: ABNORMAL
DIFF PNL FLD: ABNORMAL
EGFR: 124 ML/MIN/1.73M2 — SIGNIFICANT CHANGE UP
EGFR: 124 ML/MIN/1.73M2 — SIGNIFICANT CHANGE UP
EOSINOPHIL # BLD AUTO: 0.1 K/UL — SIGNIFICANT CHANGE UP (ref 0–0.5)
EOSINOPHIL # BLD AUTO: 0.1 K/UL — SIGNIFICANT CHANGE UP (ref 0–0.5)
EOSINOPHIL NFR BLD AUTO: 0.8 % — SIGNIFICANT CHANGE UP (ref 0–6)
EOSINOPHIL NFR BLD AUTO: 0.8 % — SIGNIFICANT CHANGE UP (ref 0–6)
FIBRINOGEN PPP-MCNC: 530 MG/DL — HIGH (ref 200–465)
FIBRINOGEN PPP-MCNC: 530 MG/DL — HIGH (ref 200–465)
GLUCOSE SERPL-MCNC: 79 MG/DL — SIGNIFICANT CHANGE UP (ref 70–99)
GLUCOSE SERPL-MCNC: 79 MG/DL — SIGNIFICANT CHANGE UP (ref 70–99)
GLUCOSE UR QL: NEGATIVE MG/DL — SIGNIFICANT CHANGE UP
GLUCOSE UR QL: NEGATIVE MG/DL — SIGNIFICANT CHANGE UP
HCT VFR BLD CALC: 32.6 % — LOW (ref 34.5–45)
HCT VFR BLD CALC: 32.6 % — LOW (ref 34.5–45)
HGB BLD-MCNC: 9.8 G/DL — LOW (ref 11.5–15.5)
HGB BLD-MCNC: 9.8 G/DL — LOW (ref 11.5–15.5)
IANC: 8.06 K/UL — HIGH (ref 1.8–7.4)
IANC: 8.06 K/UL — HIGH (ref 1.8–7.4)
IMM GRANULOCYTES NFR BLD AUTO: 0.6 % — SIGNIFICANT CHANGE UP (ref 0–0.9)
IMM GRANULOCYTES NFR BLD AUTO: 0.6 % — SIGNIFICANT CHANGE UP (ref 0–0.9)
INR BLD: <0.9 RATIO — SIGNIFICANT CHANGE UP (ref 0.85–1.18)
INR BLD: <0.9 RATIO — SIGNIFICANT CHANGE UP (ref 0.85–1.18)
KETONES UR-MCNC: NEGATIVE MG/DL — SIGNIFICANT CHANGE UP
KETONES UR-MCNC: NEGATIVE MG/DL — SIGNIFICANT CHANGE UP
LDH SERPL L TO P-CCNC: 174 U/L — SIGNIFICANT CHANGE UP (ref 135–225)
LDH SERPL L TO P-CCNC: 174 U/L — SIGNIFICANT CHANGE UP (ref 135–225)
LEUKOCYTE ESTERASE UR-ACNC: ABNORMAL
LEUKOCYTE ESTERASE UR-ACNC: ABNORMAL
LYMPHOCYTES # BLD AUTO: 2.81 K/UL — SIGNIFICANT CHANGE UP (ref 1–3.3)
LYMPHOCYTES # BLD AUTO: 2.81 K/UL — SIGNIFICANT CHANGE UP (ref 1–3.3)
LYMPHOCYTES # BLD AUTO: 23.4 % — SIGNIFICANT CHANGE UP (ref 13–44)
LYMPHOCYTES # BLD AUTO: 23.4 % — SIGNIFICANT CHANGE UP (ref 13–44)
MCHC RBC-ENTMCNC: 20.9 PG — LOW (ref 27–34)
MCHC RBC-ENTMCNC: 20.9 PG — LOW (ref 27–34)
MCHC RBC-ENTMCNC: 30.1 GM/DL — LOW (ref 32–36)
MCHC RBC-ENTMCNC: 30.1 GM/DL — LOW (ref 32–36)
MCV RBC AUTO: 69.5 FL — LOW (ref 80–100)
MCV RBC AUTO: 69.5 FL — LOW (ref 80–100)
MONOCYTES # BLD AUTO: 0.9 K/UL — SIGNIFICANT CHANGE UP (ref 0–0.9)
MONOCYTES # BLD AUTO: 0.9 K/UL — SIGNIFICANT CHANGE UP (ref 0–0.9)
MONOCYTES NFR BLD AUTO: 7.5 % — SIGNIFICANT CHANGE UP (ref 2–14)
MONOCYTES NFR BLD AUTO: 7.5 % — SIGNIFICANT CHANGE UP (ref 2–14)
NEUTROPHILS # BLD AUTO: 8.06 K/UL — HIGH (ref 1.8–7.4)
NEUTROPHILS # BLD AUTO: 8.06 K/UL — HIGH (ref 1.8–7.4)
NEUTROPHILS NFR BLD AUTO: 67.2 % — SIGNIFICANT CHANGE UP (ref 43–77)
NEUTROPHILS NFR BLD AUTO: 67.2 % — SIGNIFICANT CHANGE UP (ref 43–77)
NITRITE UR-MCNC: NEGATIVE — SIGNIFICANT CHANGE UP
NITRITE UR-MCNC: NEGATIVE — SIGNIFICANT CHANGE UP
NRBC # BLD: 0 /100 WBCS — SIGNIFICANT CHANGE UP (ref 0–0)
NRBC # BLD: 0 /100 WBCS — SIGNIFICANT CHANGE UP (ref 0–0)
NRBC # FLD: 0 K/UL — SIGNIFICANT CHANGE UP (ref 0–0)
NRBC # FLD: 0 K/UL — SIGNIFICANT CHANGE UP (ref 0–0)
PH UR: 7 — SIGNIFICANT CHANGE UP (ref 5–8)
PH UR: 7 — SIGNIFICANT CHANGE UP (ref 5–8)
PLATELET # BLD AUTO: 307 K/UL — SIGNIFICANT CHANGE UP (ref 150–400)
PLATELET # BLD AUTO: 307 K/UL — SIGNIFICANT CHANGE UP (ref 150–400)
POTASSIUM SERPL-MCNC: 4.1 MMOL/L — SIGNIFICANT CHANGE UP (ref 3.5–5.3)
POTASSIUM SERPL-MCNC: 4.1 MMOL/L — SIGNIFICANT CHANGE UP (ref 3.5–5.3)
POTASSIUM SERPL-SCNC: 4.1 MMOL/L — SIGNIFICANT CHANGE UP (ref 3.5–5.3)
POTASSIUM SERPL-SCNC: 4.1 MMOL/L — SIGNIFICANT CHANGE UP (ref 3.5–5.3)
PROT ?TM UR-MCNC: 10 MG/DL — SIGNIFICANT CHANGE UP
PROT ?TM UR-MCNC: 10 MG/DL — SIGNIFICANT CHANGE UP
PROT SERPL-MCNC: 7 G/DL — SIGNIFICANT CHANGE UP (ref 6–8.3)
PROT SERPL-MCNC: 7 G/DL — SIGNIFICANT CHANGE UP (ref 6–8.3)
PROT UR-MCNC: NEGATIVE MG/DL — SIGNIFICANT CHANGE UP
PROT UR-MCNC: NEGATIVE MG/DL — SIGNIFICANT CHANGE UP
PROT/CREAT UR-RTO: 0.2 RATIO — SIGNIFICANT CHANGE UP (ref 0–0.2)
PROT/CREAT UR-RTO: 0.2 RATIO — SIGNIFICANT CHANGE UP (ref 0–0.2)
PROTHROM AB SERPL-ACNC: 10 SEC — SIGNIFICANT CHANGE UP (ref 9.5–13)
PROTHROM AB SERPL-ACNC: 10 SEC — SIGNIFICANT CHANGE UP (ref 9.5–13)
RBC # BLD: 4.69 M/UL — SIGNIFICANT CHANGE UP (ref 3.8–5.2)
RBC # BLD: 4.69 M/UL — SIGNIFICANT CHANGE UP (ref 3.8–5.2)
RBC # FLD: 18.2 % — HIGH (ref 10.3–14.5)
RBC # FLD: 18.2 % — HIGH (ref 10.3–14.5)
RBC CASTS # UR COMP ASSIST: 7 /HPF — HIGH (ref 0–4)
RBC CASTS # UR COMP ASSIST: 7 /HPF — HIGH (ref 0–4)
RH IG SCN BLD-IMP: POSITIVE — SIGNIFICANT CHANGE UP
RH IG SCN BLD-IMP: POSITIVE — SIGNIFICANT CHANGE UP
SODIUM SERPL-SCNC: 135 MMOL/L — SIGNIFICANT CHANGE UP (ref 135–145)
SODIUM SERPL-SCNC: 135 MMOL/L — SIGNIFICANT CHANGE UP (ref 135–145)
SP GR SPEC: 1.01 — SIGNIFICANT CHANGE UP (ref 1–1.03)
SP GR SPEC: 1.01 — SIGNIFICANT CHANGE UP (ref 1–1.03)
SQUAMOUS # UR AUTO: 3 /HPF — SIGNIFICANT CHANGE UP (ref 0–5)
SQUAMOUS # UR AUTO: 3 /HPF — SIGNIFICANT CHANGE UP (ref 0–5)
T PALLIDUM AB TITR SER: NEGATIVE — SIGNIFICANT CHANGE UP
T PALLIDUM AB TITR SER: NEGATIVE — SIGNIFICANT CHANGE UP
URATE SERPL-MCNC: 3.2 MG/DL — SIGNIFICANT CHANGE UP (ref 2.5–7)
URATE SERPL-MCNC: 3.2 MG/DL — SIGNIFICANT CHANGE UP (ref 2.5–7)
UROBILINOGEN FLD QL: 0.2 MG/DL — SIGNIFICANT CHANGE UP (ref 0.2–1)
UROBILINOGEN FLD QL: 0.2 MG/DL — SIGNIFICANT CHANGE UP (ref 0.2–1)
WBC # BLD: 12 K/UL — HIGH (ref 3.8–10.5)
WBC # BLD: 12 K/UL — HIGH (ref 3.8–10.5)
WBC # FLD AUTO: 12 K/UL — HIGH (ref 3.8–10.5)
WBC # FLD AUTO: 12 K/UL — HIGH (ref 3.8–10.5)
WBC UR QL: 33 /HPF — HIGH (ref 0–5)
WBC UR QL: 33 /HPF — HIGH (ref 0–5)

## 2023-12-02 RX ORDER — SODIUM CHLORIDE 9 MG/ML
3 INJECTION INTRAMUSCULAR; INTRAVENOUS; SUBCUTANEOUS EVERY 8 HOURS
Refills: 0 | Status: DISCONTINUED | OUTPATIENT
Start: 2023-12-02 | End: 2023-12-04

## 2023-12-02 RX ORDER — OXYTOCIN 10 UNIT/ML
41.67 VIAL (ML) INJECTION
Qty: 20 | Refills: 0 | Status: DISCONTINUED | OUTPATIENT
Start: 2023-12-02 | End: 2023-12-03

## 2023-12-02 RX ORDER — SIMETHICONE 80 MG/1
80 TABLET, CHEWABLE ORAL EVERY 4 HOURS
Refills: 0 | Status: DISCONTINUED | OUTPATIENT
Start: 2023-12-02 | End: 2023-12-04

## 2023-12-02 RX ORDER — IBUPROFEN 200 MG
600 TABLET ORAL EVERY 6 HOURS
Refills: 0 | Status: DISCONTINUED | OUTPATIENT
Start: 2023-12-02 | End: 2023-12-04

## 2023-12-02 RX ORDER — ACETAMINOPHEN 500 MG
975 TABLET ORAL
Refills: 0 | Status: DISCONTINUED | OUTPATIENT
Start: 2023-12-02 | End: 2023-12-04

## 2023-12-02 RX ORDER — OXYCODONE HYDROCHLORIDE 5 MG/1
5 TABLET ORAL
Refills: 0 | Status: DISCONTINUED | OUTPATIENT
Start: 2023-12-02 | End: 2023-12-04

## 2023-12-02 RX ORDER — INFLUENZA VIRUS VACCINE 15; 15; 15; 15 UG/.5ML; UG/.5ML; UG/.5ML; UG/.5ML
0.5 SUSPENSION INTRAMUSCULAR ONCE
Refills: 0 | Status: DISCONTINUED | OUTPATIENT
Start: 2023-12-02 | End: 2023-12-04

## 2023-12-02 RX ORDER — AER TRAVELER 0.5 G/1
1 SOLUTION RECTAL; TOPICAL EVERY 4 HOURS
Refills: 0 | Status: DISCONTINUED | OUTPATIENT
Start: 2023-12-02 | End: 2023-12-04

## 2023-12-02 RX ORDER — KETOROLAC TROMETHAMINE 30 MG/ML
30 SYRINGE (ML) INJECTION ONCE
Refills: 0 | Status: DISCONTINUED | OUTPATIENT
Start: 2023-12-02 | End: 2023-12-02

## 2023-12-02 RX ORDER — OXYCODONE HYDROCHLORIDE 5 MG/1
5 TABLET ORAL ONCE
Refills: 0 | Status: DISCONTINUED | OUTPATIENT
Start: 2023-12-02 | End: 2023-12-04

## 2023-12-02 RX ORDER — IBUPROFEN 200 MG
600 TABLET ORAL EVERY 6 HOURS
Refills: 0 | Status: COMPLETED | OUTPATIENT
Start: 2023-12-02 | End: 2024-10-30

## 2023-12-02 RX ORDER — LANOLIN
1 OINTMENT (GRAM) TOPICAL EVERY 6 HOURS
Refills: 0 | Status: DISCONTINUED | OUTPATIENT
Start: 2023-12-02 | End: 2023-12-04

## 2023-12-02 RX ORDER — MAGNESIUM HYDROXIDE 400 MG/1
30 TABLET, CHEWABLE ORAL
Refills: 0 | Status: DISCONTINUED | OUTPATIENT
Start: 2023-12-02 | End: 2023-12-04

## 2023-12-02 RX ORDER — DIPHENHYDRAMINE HCL 50 MG
25 CAPSULE ORAL EVERY 6 HOURS
Refills: 0 | Status: DISCONTINUED | OUTPATIENT
Start: 2023-12-02 | End: 2023-12-04

## 2023-12-02 RX ORDER — DIBUCAINE 1 %
1 OINTMENT (GRAM) RECTAL EVERY 6 HOURS
Refills: 0 | Status: DISCONTINUED | OUTPATIENT
Start: 2023-12-02 | End: 2023-12-04

## 2023-12-02 RX ORDER — TETANUS TOXOID, REDUCED DIPHTHERIA TOXOID AND ACELLULAR PERTUSSIS VACCINE, ADSORBED 5; 2.5; 8; 8; 2.5 [IU]/.5ML; [IU]/.5ML; UG/.5ML; UG/.5ML; UG/.5ML
0.5 SUSPENSION INTRAMUSCULAR ONCE
Refills: 0 | Status: DISCONTINUED | OUTPATIENT
Start: 2023-12-02 | End: 2023-12-04

## 2023-12-02 RX ORDER — HYDROCORTISONE 1 %
1 OINTMENT (GRAM) TOPICAL EVERY 6 HOURS
Refills: 0 | Status: DISCONTINUED | OUTPATIENT
Start: 2023-12-02 | End: 2023-12-04

## 2023-12-02 RX ORDER — PRAMOXINE HYDROCHLORIDE 150 MG/15G
1 AEROSOL, FOAM RECTAL EVERY 4 HOURS
Refills: 0 | Status: DISCONTINUED | OUTPATIENT
Start: 2023-12-02 | End: 2023-12-04

## 2023-12-02 RX ORDER — BENZOCAINE 10 %
1 GEL (GRAM) MUCOUS MEMBRANE EVERY 6 HOURS
Refills: 0 | Status: DISCONTINUED | OUTPATIENT
Start: 2023-12-02 | End: 2023-12-04

## 2023-12-02 RX ADMIN — Medication 600 MILLIGRAM(S): at 23:35

## 2023-12-02 RX ADMIN — SODIUM CHLORIDE 3 MILLILITER(S): 9 INJECTION INTRAMUSCULAR; INTRAVENOUS; SUBCUTANEOUS at 13:29

## 2023-12-02 RX ADMIN — Medication 125 MILLIUNIT(S)/MIN: at 06:29

## 2023-12-02 RX ADMIN — CHLORHEXIDINE GLUCONATE 1 APPLICATION(S): 213 SOLUTION TOPICAL at 01:05

## 2023-12-02 RX ADMIN — Medication 600 MILLIGRAM(S): at 17:55

## 2023-12-02 RX ADMIN — Medication 975 MILLIGRAM(S): at 09:40

## 2023-12-02 RX ADMIN — Medication 600 MILLIGRAM(S): at 17:13

## 2023-12-02 RX ADMIN — SODIUM CHLORIDE 125 MILLILITER(S): 9 INJECTION, SOLUTION INTRAVENOUS at 01:06

## 2023-12-02 RX ADMIN — Medication 975 MILLIGRAM(S): at 10:20

## 2023-12-02 RX ADMIN — Medication 30 MILLIGRAM(S): at 07:57

## 2023-12-02 RX ADMIN — Medication 975 MILLIGRAM(S): at 20:45

## 2023-12-02 RX ADMIN — Medication 975 MILLIGRAM(S): at 20:15

## 2023-12-02 RX ADMIN — SODIUM CHLORIDE 3 MILLILITER(S): 9 INJECTION INTRAMUSCULAR; INTRAVENOUS; SUBCUTANEOUS at 22:00

## 2023-12-02 NOTE — DISCHARGE NOTE OB - MATERIALS PROVIDED
Vaccinations/[facility]  Screening Program/  Immunization Record/Breastfeeding Log/Bottle Feeding Log/Breastfeeding Mother’s Support Group Information/Guide to Postpartum Care/[facility] Hearing Screen Program/Back To Sleep Handout/Shaken Baby Prevention Handout/Breastfeeding Guide and Packet/Birth Certificate Instructions/Discharge Medication Information for Patients and Families Pocket Guide Vaccinations/[facility]  Screening Program/  Immunization Record/Breastfeeding Log/Bottle Feeding Log/Breastfeeding Mother’s Support Group Information/Guide to Postpartum Care/[facility] Hearing Screen Program/Back To Sleep Handout/Shaken Baby Prevention Handout/Breastfeeding Guide and Packet/Birth Certificate Instructions/Discharge Medication Information for Patients and Families Pocket Guide Vaccinations/[facility]  Screening Program/  Immunization Record/Breastfeeding Log/Bottle Feeding Log/Breastfeeding Mother’s Support Group Information/Guide to Postpartum Care/[facility] Hearing Screen Program/Back To Sleep Handout/Shaken Baby Prevention Handout/Breastfeeding Guide and Packet/Birth Certificate Instructions/Discharge Medication Information for Patients and Families Pocket Guide

## 2023-12-02 NOTE — OB PROVIDER DELIVERY SUMMARY - NSPROVIDERDELIVERYNOTE_OBGYN_ALL_OB_FT
Attending Note   Pt progressed to 10/100/+3   Delivered vertex over intact perineum   Nuchal cord x 3   Body and shoulders delivered  Placenta delivered intact  Uterine cavity empty   Vagina, rectum and cervix inspected  no laceration noted  Boy for circ

## 2023-12-02 NOTE — DISCHARGE NOTE OB - PATIENT PORTAL LINK FT
You can access the FollowMyHealth Patient Portal offered by [facility] by registering at the following website: [email or web address]. By joining [employer]’s FollowMyHealth portal, you will also be able to view your health information using other applications (apps) compatible with our system. You can access the FollowMyHealth Patient Portal offered by [facility] by registering at the following website: [email or web address]. By joining [facility]’s FollowMyHealth portal, you will also be able to view your health information using other applications (apps) compatible with our system. You can access the FollowMyHealth Patient Portal offered by [facility] by registering at the following website: [email or web address]. By joining [employer]’s FollowMyHealth portal, you will also be able to view your health information using other applications (apps) compatible with our system.

## 2023-12-02 NOTE — OB RN DELIVERY SUMMARY - NS_SEPSISRSKCALC_OBGYN_ALL_OB_FT
EOS calculated successfully. EOS Risk Factor: 0.5/1000 live births ([facility] national incidence); GA=40w5d; Temp=98.24; ROM=1.15; GBS='Negative'; Antibiotics='No antibiotics or any antibiotics < 2 hrs prior to birth'

## 2023-12-02 NOTE — DISCHARGE NOTE OB - PROVIDER TOKENS
PROVIDER:[TOKEN:[[id number]]] PROVIDER:[TOKEN:[[id number]]] PROVIDER:[TOKEN:[[id number]]]

## 2023-12-02 NOTE — DISCHARGE NOTE OB - CARE PLAN
Principal Discharge DX:	Normal vaginal delivery  Assessment and plan of treatment:	nothing in the vagina x 6 weeks   1 Principal Discharge DX:	Normal vaginal delivery  Assessment and plan of treatment:	After discharge, please stay on pelvic rest for 6 weeks, meaning no sexual intercourse, no tampons and no douching.  No driving for 2 weeks.  No lifting objects heavier than baby for 2 weeks.  Expect to have vaginal bleeding/spotting for up to six weeks.  The bleeding should get lighter and more white/light brown with time.  For bleeding soaking more than a pad an hour or passing clots greater than the size of your fist, come in to the   emergency department.  Follow up in the office in 6 weeks  Secondary Diagnosis:	Gestational HTN  Assessment and plan of treatment:	 your blood pressure monitor from [facility] on 1st floor of [facility]. Follow-up in your OB office within 1 week for a blood pressure check.   Please take your blood pressure 3x/day. Call your doctor if your blood pressure is 140 (top number) OR 90 (bottom number) or higher. Return to hospital if your blood pressure is 160 (top number)  (bottom number) or higher. Call your doctor if you experience symptoms such as headache, blurry vision, epigastric pain, or nausea/vomiting.   Principal Discharge DX:	Normal vaginal delivery  Assessment and plan of treatment:	After discharge, please stay on pelvic rest for 6 weeks, meaning no sexual intercourse, no tampons and no douching.  No driving for 2 weeks.  No lifting objects heavier than baby for 2 weeks.  Expect to have vaginal bleeding/spotting for up to six weeks.  The bleeding should get lighter and more white/light brown with time.  For bleeding soaking more than a pad an hour or passing clots greater than the size of your fist, come in to the   emergency department.  Follow up in the office in 6 weeks  Secondary Diagnosis:	Gestational HTN  Assessment and plan of treatment:	 your blood pressure monitor from [facility] on 1st floor of [facility]. Follow-up in your OB office within 1 week for a blood pressure check.   Please take your blood pressure 3x/day. Call your doctor if your blood pressure is 140 (top number) OR 90 (bottom number) or higher. Return to hospital if your blood pressure is 160 (top number)  (bottom number) or higher. Call your doctor if you experience symptoms such as headache, blurry vision, epigastric pain, or nausea/vomiting.   Principal Discharge DX:	Normal vaginal delivery  Assessment and plan of treatment:	After discharge, please stay on pelvic rest for 6 weeks, meaning no sexual intercourse, no tampons and no douching.  No driving for 2 weeks.  No lifting objects heavier than baby for 2 weeks.  Expect to have vaginal bleeding/spotting for up to six weeks.  The bleeding should get lighter and more white/light brown with time.  For bleeding soaking more than a pad an hour or passing clots greater than the size of your fist, come in to the   emergency department.  Follow up in the office in 6 weeks  Secondary Diagnosis:	Gestational HTN  Assessment and plan of treatment:	 your blood pressure monitor from [facility] on 1st floor of [facility]. Follow-up in your OB office within 1 week for a blood pressure check.   Please take your blood pressure 3x/day. Call your doctor if your blood pressure is 140 (top number) OR 90 (bottom number) or higher. Return to hospital if your blood pressure is 160 (top number)  (bottom number) or higher. Call your doctor if you experience symptoms such as headache, blurry vision, epigastric pain, or nausea/vomiting.

## 2023-12-02 NOTE — DISCHARGE NOTE OB - NS MD DC FALL RISK RISK
For information on Fall & Injury Prevention, visit: [email or web address]://[email or web address]/news/fall-[email or web address]-and-maintains-health-and-mobility OR  https://www.[facility]/news/fall-prevention-tips-to-avoid-injury OR  https://www.cdc.gov/steadi/patient.html For information on Fall & Injury Prevention, visit: https://www.[facility]/news/fall-prevention-protects-and-maintains-health-and-mobility OR  https://www.[facility]/news/fall-prevention-tips-to-avoid-injury OR  https://www.cdc.gov/steadi/patient.html For information on Fall & Injury Prevention, visit: https://www.[facility]/news/fall-prevention-protects-and-maintains-health-and-mobility OR  https://www.[facility]/news/fall-prevention-tips-to-avoid-injury OR  https://www.cdc.gov/steadi/patient.html

## 2023-12-02 NOTE — OB RN DELIVERY SUMMARY - NSSELHIDDEN_OBGYN_ALL_OB_FT
[NS_DeliveryAttending1_OBGYN_ALL_OB_FT:[email or web address]],[NS_DeliveryRN_OBGYN_ALL_OB_FT:[email or web address]=]

## 2023-12-02 NOTE — DISCHARGE NOTE OB - CARE PROVIDER_API CALL
Atiya Phoenix  Obstetrics and Gynecology  [address], [address]  [place] [address]  Phone: [phone]  Fax: [phone]  Follow Up Time:    Atiya Phoenix  Obstetrics and Gynecology  [address] [facility], [address]  [place] [address]  Phone: [phone]  Fax: [phone]  Follow Up Time:    Atiya Phoenix  Obstetrics and Gynecology  [address] [facility] [facility] [facility], [address]  [place] [address]  Phone: [phone]  Fax: [phone]  Follow Up Time:

## 2023-12-02 NOTE — DISCHARGE NOTE OB - MEDICATION SUMMARY - MEDICATIONS TO TAKE
I will START or STAY ON the medications listed below when I get home from the hospital:    ibuprofen 600 mg oral tablet  -- 1 tab(s) by mouth every 6 hours as needed for  moderate pain  -- Indication: For Pain    acetaminophen 325 mg oral tablet  -- 3 tab(s) by mouth every 6 hours as needed for  mild pain  -- Indication: For Pain    multivitamin, prenatal  -- 1 tab(s) by mouth once a day  -- Indication: For Vitamin   I will START or STAY ON the medications listed below when I get home from the hospital:    Electronic Blood Pressure Monitor  -- Take your blood pressure three times a  day.  Notify MD if blood pressures are greater than 140/90.  Return to hospital for blood pressures greater than 160/100  -- Indication: For Gestational HTN    ibuprofen 600 mg oral tablet  -- 1 tab(s) by mouth every 6 hours as needed for  moderate pain  -- Indication: For Pain    acetaminophen 325 mg oral tablet  -- 3 tab(s) by mouth every 6 hours as needed for  mild pain  -- Indication: For Pain    multivitamin, prenatal  -- 1 tab(s) by mouth once a day  -- Indication: For Vitamin

## 2023-12-02 NOTE — DISCHARGE NOTE OB - PLAN OF CARE
nothing in the vagina x 6 weeks After discharge, please stay on pelvic rest for 6 weeks, meaning no sexual intercourse, no tampons and no douching.  No driving for 2 weeks.  No lifting objects heavier than baby for 2 weeks.  Expect to have vaginal bleeding/spotting for up to six weeks.  The bleeding should get lighter and more white/light brown with time.  For bleeding soaking more than a pad an hour or passing clots greater than the size of your fist, come in to the   emergency department.  Follow up in the office in 6 weeks  your blood pressure monitor from [facility] on 1st floor of [facility]. Follow-up in your OB office within 1 week for a blood pressure check.   Please take your blood pressure 3x/day. Call your doctor if your blood pressure is 140 (top number) OR 90 (bottom number) or higher. Return to hospital if your blood pressure is 160 (top number)  (bottom number) or higher. Call your doctor if you experience symptoms such as headache, blurry vision, epigastric pain, or nausea/vomiting.  your blood pressure monitor from [facility] on 1st floor of [facility]. Follow-up in your OB office within 1 week for a blood pressure check.   Please take your blood pressure 3x/day. Call your doctor if your blood pressure is 140 (top number) OR 90 (bottom number) or higher. Return to hospital if your blood pressure is 160 (top number)  (bottom number) or higher. Call your doctor if you experience symptoms such as headache, blurry vision, epigastric pain, or nausea/vomiting.  your blood pressure monitor from [facility] on 1st floor of [facility]. Follow-up in your OB office within 1 week for a blood pressure check.   Please take your blood pressure 3x/day. Call your doctor if your blood pressure is 140 (top number) OR 90 (bottom number) or higher. Return to hospital if your blood pressure is 160 (top number)  (bottom number) or higher. Call your doctor if you experience symptoms such as headache, blurry vision, epigastric pain, or nausea/vomiting.

## 2023-12-02 NOTE — DISCHARGE NOTE OB - FINDINGS/TREATMENT
please take the blood pressure x 3 days   please call if BP > 140/90, headache, vision changes, right upper quadrant epigastric and lower extremity swelling nothing in the vagina x 6 weeks

## 2023-12-03 RX ADMIN — Medication 600 MILLIGRAM(S): at 06:21

## 2023-12-03 RX ADMIN — Medication 975 MILLIGRAM(S): at 21:45

## 2023-12-03 RX ADMIN — Medication 600 MILLIGRAM(S): at 05:51

## 2023-12-03 RX ADMIN — Medication 600 MILLIGRAM(S): at 18:52

## 2023-12-03 RX ADMIN — SODIUM CHLORIDE 3 MILLILITER(S): 9 INJECTION INTRAMUSCULAR; INTRAVENOUS; SUBCUTANEOUS at 16:00

## 2023-12-03 RX ADMIN — Medication 1 TABLET(S): at 11:48

## 2023-12-03 RX ADMIN — SODIUM CHLORIDE 3 MILLILITER(S): 9 INJECTION INTRAMUSCULAR; INTRAVENOUS; SUBCUTANEOUS at 21:41

## 2023-12-03 RX ADMIN — Medication 600 MILLIGRAM(S): at 00:05

## 2023-12-03 RX ADMIN — Medication 600 MILLIGRAM(S): at 12:27

## 2023-12-03 RX ADMIN — Medication 975 MILLIGRAM(S): at 09:30

## 2023-12-03 RX ADMIN — Medication 975 MILLIGRAM(S): at 15:41

## 2023-12-03 RX ADMIN — Medication 600 MILLIGRAM(S): at 19:30

## 2023-12-03 RX ADMIN — SODIUM CHLORIDE 3 MILLILITER(S): 9 INJECTION INTRAMUSCULAR; INTRAVENOUS; SUBCUTANEOUS at 06:01

## 2023-12-03 RX ADMIN — Medication 975 MILLIGRAM(S): at 16:30

## 2023-12-03 RX ADMIN — Medication 600 MILLIGRAM(S): at 11:48

## 2023-12-03 RX ADMIN — Medication 975 MILLIGRAM(S): at 21:15

## 2023-12-03 RX ADMIN — Medication 975 MILLIGRAM(S): at 08:58

## 2023-12-03 NOTE — PROGRESS NOTE ADULT - ASSESSMENT
A/P: 34yo PPD#1 s/p .  Patient is stable and doing well post-partum.   - Pain well controlled, continue current pain regimen  - Increase ambulation, SCDs when not ambulating  - Continue regular diet  -Discharge planning for tomorrow    E Lucita KHAN A/P: 32yo PPD#1 s/p .  Patient is stable and doing well post-partum.   - Pain well controlled, continue current pain regimen  - Increase ambulation, SCDs when not ambulating  - Continue regular diet  -Discharge planning for tomorrow    E Lucita KHAN

## 2023-12-03 NOTE — PROGRESS NOTE ADULT - SUBJECTIVE AND OBJECTIVE BOX
OB Attending Progress Note:  PPD#1    S: 34yo  PPD#1 s/p . Patient feels well. Pain is well controlled. She is tolerating a regular diet and passing flatus. She is voiding spontaneously, and ambulating without difficulty. Denies CP/SOB. Denies lightheadedness/dizziness. Denies N/V.    O:  Vitals:  Vital Signs Last 24 Hrs  T(C): 37 (03 Dec 2023 10:32), Max: 37 (03 Dec 2023 10:32)  HR: 92 (03 Dec 2023 10:32) (79 - 97)  BP: 110/65 (03 Dec 2023 10:32) (110/65 - 135/86)  RR: 17 (03 Dec 2023 10:32) (16 - 18)  SpO2: 100% (03 Dec 2023 10:32) (99% - 100%)    Parameters below as of 03 Dec 2023 10:32  Patient On (Oxygen Delivery Method): room air    Labs:  Blood type: B Positive  Rubella IgG: RPR: Negative                          9.8<L>   12.00<H> >-----------< 307    (  @ 23:40 )             32.6<L>    23 @ 23:40      135  |  102  |  9   ----------------------------<  79  4.1   |  22  |  0.58        Ca    9.0      01 Dec 2023 23:40    TPro  7.0  /  Alb  3.6  /  TBili  0.3  /  DBili  x   /  AST  19  /  ALT  15  /  AlkPhos  282<H>  23 @ 23:40          Physical Exam:  General: NAD  Abdomen: soft, non-tender, non-distended, fundus firm  Vaginal: Lochia wnl  Extremities: No erythema/edema, no calf tenderness b/l      OB Attending Progress Note:  PPD#1    S: 32yo  PPD#1 s/p . Patient feels well. Pain is well controlled. She is tolerating a regular diet and passing flatus. She is voiding spontaneously, and ambulating without difficulty. Denies CP/SOB. Denies lightheadedness/dizziness. Denies N/V.    O:  Vitals:  Vital Signs Last 24 Hrs  T(C): 37 (03 Dec 2023 10:32), Max: 37 (03 Dec 2023 10:32)  HR: 92 (03 Dec 2023 10:32) (79 - 97)  BP: 110/65 (03 Dec 2023 10:32) (110/65 - 135/86)  RR: 17 (03 Dec 2023 10:32) (16 - 18)  SpO2: 100% (03 Dec 2023 10:32) (99% - 100%)    Parameters below as of 03 Dec 2023 10:32  Patient On (Oxygen Delivery Method): room air    Labs:  Blood type: B Positive  Rubella IgG: RPR: Negative                          9.8<L>   12.00<H> >-----------< 307    (  @ 23:40 )             32.6<L>    23 @ 23:40      135  |  102  |  9   ----------------------------<  79  4.1   |  22  |  0.58        Ca    9.0      01 Dec 2023 23:40    TPro  7.0  /  Alb  3.6  /  TBili  0.3  /  DBili  x   /  AST  19  /  ALT  15  /  AlkPhos  282<H>  23 @ 23:40          Physical Exam:  General: NAD  Abdomen: soft, non-tender, non-distended, fundus firm  Vaginal: Lochia wnl  Extremities: No erythema/edema, no calf tenderness b/l

## 2023-12-03 NOTE — PROVIDER CONTACT NOTE (OTHER) - ASSESSMENT
Fundus firm without massage, light lochia rubra noted, patient denies any sob, chest pain, palpitations, and lightheadedness.

## 2023-12-03 NOTE — PROVIDER CONTACT NOTE (OTHER) - ACTION/TREATMENT ORDERED:
Patient continues to deny any sob, chest pain, palpitations. No orders given will continue to assess.

## 2023-12-03 NOTE — PROGRESS NOTE ADULT - SUBJECTIVE AND OBJECTIVE BOX
INTERVAL HPI/OVERNIGHT EVENTS:  33y Female s/p labor epidural    Vital Signs Last 24 Hrs  T(C): 36.9 (03 Dec 2023 14:18), Max: 37 (03 Dec 2023 10:32)  T(F): 98.4 (03 Dec 2023 14:18), Max: 98.6 (03 Dec 2023 10:32)  HR: 85 (03 Dec 2023 14:18) (79 - 92)  BP: 127/77 (03 Dec 2023 14:18) (110/65 - 135/86)  BP(mean): --  RR: 18 (03 Dec 2023 14:18) (16 - 18)  SpO2: 100% (03 Dec 2023 14:18) (99% - 100%)    Parameters below as of 03 Dec 2023 14:18  Patient On (Oxygen Delivery Method): room air        Patient seen, doing well, no headache, no residual numbness or weakness, no anesthetic complications or complaints noted or reported.

## 2023-12-04 VITALS
DIASTOLIC BLOOD PRESSURE: 89 MMHG | TEMPERATURE: 98 F | OXYGEN SATURATION: 97 % | SYSTOLIC BLOOD PRESSURE: 129 MMHG | HEART RATE: 86 BPM | RESPIRATION RATE: 18 BRPM

## 2023-12-04 RX ORDER — ACETAMINOPHEN 500 MG
3 TABLET ORAL
Qty: 0 | Refills: 0 | DISCHARGE
Start: 2023-12-04

## 2023-12-04 RX ORDER — IBUPROFEN 200 MG
1 TABLET ORAL
Qty: 0 | Refills: 0 | DISCHARGE
Start: 2023-12-04

## 2023-12-04 RX ADMIN — Medication 975 MILLIGRAM(S): at 04:02

## 2023-12-04 RX ADMIN — Medication 600 MILLIGRAM(S): at 12:25

## 2023-12-04 RX ADMIN — Medication 600 MILLIGRAM(S): at 00:40

## 2023-12-04 RX ADMIN — Medication 600 MILLIGRAM(S): at 11:50

## 2023-12-04 RX ADMIN — Medication 600 MILLIGRAM(S): at 00:10

## 2023-12-04 RX ADMIN — Medication 975 MILLIGRAM(S): at 15:30

## 2023-12-04 RX ADMIN — Medication 1 TABLET(S): at 11:50

## 2023-12-04 RX ADMIN — Medication 975 MILLIGRAM(S): at 08:11

## 2023-12-04 RX ADMIN — Medication 600 MILLIGRAM(S): at 06:37

## 2023-12-04 RX ADMIN — Medication 975 MILLIGRAM(S): at 14:37

## 2023-12-04 RX ADMIN — Medication 975 MILLIGRAM(S): at 03:32

## 2023-12-04 RX ADMIN — Medication 600 MILLIGRAM(S): at 06:07

## 2023-12-04 RX ADMIN — Medication 975 MILLIGRAM(S): at 09:00

## 2023-12-04 RX ADMIN — SODIUM CHLORIDE 3 MILLILITER(S): 9 INJECTION INTRAMUSCULAR; INTRAVENOUS; SUBCUTANEOUS at 13:15

## 2023-12-04 RX ADMIN — SODIUM CHLORIDE 3 MILLILITER(S): 9 INJECTION INTRAMUSCULAR; INTRAVENOUS; SUBCUTANEOUS at 06:25

## 2023-12-04 NOTE — LACTATION INITIAL EVALUATION - POTENTIAL FOR
ineffective breastfeeding/sore breast/s/sore nipples/engorgement/knowledge deficit/plugged ducts/feeding confusion/low supply/delayed secretory activation

## 2023-12-04 NOTE — LACTATION INITIAL EVALUATION - LACTATION INTERVENTIONS
Triple feeding instructions, verbal/written , reviewed with patient./initiate/review safe skin-to-skin/initiate/review hand expression/initiate/review pumping guidelines and safe milk handling/initiate/review techniques for position and latch/post discharge community resources provided/initiate/review nipple shield use/review techniques to increase milk supply/review techniques to manage sore nipples/engorgement/initiate/review breast massage/compression/reviewed components of an effective feeding and at least 8 effective feedings per day required/reviewed importance of monitoring infant diapers, the breastfeeding log, and minimum output each day/reviewed strategies to transition to breastfeeding only/reviewed benefits and recommendations for rooming in/reviewed feeding on demand/by cue at least 8 times a day

## 2023-12-04 NOTE — PROGRESS NOTE ADULT - SUBJECTIVE AND OBJECTIVE BOX
S: 32yo  PPD#2 s/p  c/b gHTN. QBL 59. Blood pressures are well controlled without medication. HELLP wnl, PCR 0.2. Patient feels well. Pain is well controlled. She is tolerating a regular diet and passing flatus. She is voiding spontaneously, and ambulating without difficulty. Denies CP/SOB. Denies lightheadedness/dizziness. Denies N/V.    O:  Vitals:   Vital Signs Last 24 Hrs  T(C): 36.8 (04 Dec 2023 05:33), Max: 37 (03 Dec 2023 10:32)  T(F): 98.3 (04 Dec 2023 05:33), Max: 98.6 (03 Dec 2023 10:32)  HR: 80 (04 Dec 2023 05:33) (79 - 92)  BP: 133/86 (04 Dec 2023 05:33) (110/65 - 133/86)  BP(mean): --  RR: 18 (04 Dec 2023 05:33) (16 - 18)  SpO2: 99% (04 Dec 2023 05:33) (99% - 100%)    Parameters above as of 04 Dec 2023 05:33  Patient On (Oxygen Delivery Method): room air        MEDICATIONS  (STANDING):  acetaminophen     Tablet .. 975 milliGRAM(s) Oral <User Schedule>  diphtheria/tetanus/pertussis (acellular) Vaccine (Adacel) 0.5 milliLiter(s) IntraMuscular once  ibuprofen  Tablet. 600 milliGRAM(s) Oral every 6 hours  influenza   Vaccine 0.5 milliLiter(s) IntraMuscular once  prenatal multivitamin 1 Tablet(s) Oral daily  sodium chloride 0.9% lock flush 3 milliLiter(s) IV Push every 8 hours    MEDICATIONS  (PRN):  benzocaine 20%/menthol 0.5% Spray 1 Spray(s) Topical every 6 hours PRN for Perineal discomfort  dibucaine 1% Ointment 1 Application(s) Topical every 6 hours PRN Perineal discomfort  diphenhydrAMINE 25 milliGRAM(s) Oral every 6 hours PRN Pruritus  hydrocortisone 1% Cream 1 Application(s) Topical every 6 hours PRN Moderate Pain (4-6)  lanolin Ointment 1 Application(s) Topical every 6 hours PRN nipple soreness  magnesium hydroxide Suspension 30 milliLiter(s) Oral two times a day PRN Constipation  oxyCODONE    IR 5 milliGRAM(s) Oral every 3 hours PRN Moderate to Severe Pain (4-10)  oxyCODONE    IR 5 milliGRAM(s) Oral once PRN Moderate to Severe Pain (4-10)  pramoxine 1%/zinc 5% Cream 1 Application(s) Topical every 4 hours PRN Moderate Pain (4-6)  simethicone 80 milliGRAM(s) Chew every 4 hours PRN Gas  witch hazel Pads 1 Application(s) Topical every 4 hours PRN Perineal discomfort      Labs:  Blood type: B Positive  Rubella IgG: RPR: Negative                          9.8<L>   12.00<H> >-----------< 307    (  @ 23:40 )             32.6<L>    23 @ 23:40      135  |  102  |  9   ----------------------------<  79  4.1   |  22  |  0.58        Ca    9.0      01 Dec 2023 23:40    TPro  7.0  /  Alb  3.6  /  TBili  0.3  /  DBili  x   /  AST  19  /  ALT  15  /  AlkPhos  282<H>  23 @ 23:40        Physical Exam:  General: NAD  Abdomen: soft, non-tender, non-distended, fundus firm  Vaginal: Lochia wnl  Extremities: No erythema/edema    A/P: 32yo  PPD#2 s/p  c/b gHTN.   Patient is stable and doing well post-partum.      #gHTN  -Blood pressures well controlled  -HELLP wnl, PCR 0.2  -Script sent to [facility] for B/P monitor  -instructions given on BP monitoring; TID; call MD office if greater than 140/90; report to emergency room is greater than 160/110  -reviewed signs and symptoms related to preeclampsia with patient such as HA, Change in vision, N/V. RUQ pain   -keep log BP's to present to MD during appointment for BP check in 3 to 5 days  -All questions answered, patient verbalized understanding     #Post-Partum  - Pain well controlled, continue current pain regimen  - Increase ambulation, SCDs when not ambulating  - Continue regular diet  - Discharge planning-stable for discharge today    ROBERTA Holt S: 34yo  PPD#2 s/p  c/b gHTN. QBL 59. Blood pressures are well controlled without medication. HELLP wnl, PCR 0.2. Patient feels well. Pain is well controlled. She is tolerating a regular diet and passing flatus. She is voiding spontaneously, and ambulating without difficulty. Denies CP/SOB. Denies lightheadedness/dizziness. Denies N/V.    O:  Vitals:   Vital Signs Last 24 Hrs  T(C): 36.8 (04 Dec 2023 05:33), Max: 37 (03 Dec 2023 10:32)  T(F): 98.3 (04 Dec 2023 05:33), Max: 98.6 (03 Dec 2023 10:32)  HR: 80 (04 Dec 2023 05:33) (79 - 92)  BP: 133/86 (04 Dec 2023 05:33) (110/65 - 133/86)  BP(mean): --  RR: 18 (04 Dec 2023 05:33) (16 - 18)  SpO2: 99% (04 Dec 2023 05:33) (99% - 100%)    Parameters above as of 04 Dec 2023 05:33  Patient On (Oxygen Delivery Method): room air        MEDICATIONS  (STANDING):  acetaminophen     Tablet .. 975 milliGRAM(s) Oral <User Schedule>  diphtheria/tetanus/pertussis (acellular) Vaccine (Adacel) 0.5 milliLiter(s) IntraMuscular once  ibuprofen  Tablet. 600 milliGRAM(s) Oral every 6 hours  influenza   Vaccine 0.5 milliLiter(s) IntraMuscular once  prenatal multivitamin 1 Tablet(s) Oral daily  sodium chloride 0.9% lock flush 3 milliLiter(s) IV Push every 8 hours    MEDICATIONS  (PRN):  benzocaine 20%/menthol 0.5% Spray 1 Spray(s) Topical every 6 hours PRN for Perineal discomfort  dibucaine 1% Ointment 1 Application(s) Topical every 6 hours PRN Perineal discomfort  diphenhydrAMINE 25 milliGRAM(s) Oral every 6 hours PRN Pruritus  hydrocortisone 1% Cream 1 Application(s) Topical every 6 hours PRN Moderate Pain (4-6)  lanolin Ointment 1 Application(s) Topical every 6 hours PRN nipple soreness  magnesium hydroxide Suspension 30 milliLiter(s) Oral two times a day PRN Constipation  oxyCODONE    IR 5 milliGRAM(s) Oral every 3 hours PRN Moderate to Severe Pain (4-10)  oxyCODONE    IR 5 milliGRAM(s) Oral once PRN Moderate to Severe Pain (4-10)  pramoxine 1%/zinc 5% Cream 1 Application(s) Topical every 4 hours PRN Moderate Pain (4-6)  simethicone 80 milliGRAM(s) Chew every 4 hours PRN Gas  witch hazel Pads 1 Application(s) Topical every 4 hours PRN Perineal discomfort      Labs:  Blood type: B Positive  Rubella IgG: RPR: Negative                          9.8<L>   12.00<H> >-----------< 307    (  @ 23:40 )             32.6<L>    23 @ 23:40      135  |  102  |  9   ----------------------------<  79  4.1   |  22  |  0.58        Ca    9.0      01 Dec 2023 23:40    TPro  7.0  /  Alb  3.6  /  TBili  0.3  /  DBili  x   /  AST  19  /  ALT  15  /  AlkPhos  282<H>  23 @ 23:40        Physical Exam:  General: NAD  Abdomen: soft, non-tender, non-distended, fundus firm  Vaginal: Lochia wnl  Extremities: No erythema/edema    A/P: 34yo  PPD#2 s/p  c/b gHTN.   Patient is stable and doing well post-partum.      #gHTN  -Blood pressures well controlled  -HELLP wnl, PCR 0.2  -Script sent to [facility] for B/P monitor  -instructions given on BP monitoring; TID; call MD office if greater than 140/90; report to emergency room is greater than 160/110  -reviewed signs and symptoms related to preeclampsia with patient such as HA, Change in vision, N/V. RUQ pain   -keep log BP's to present to MD during appointment for BP check in 3 to 5 days  -All questions answered, patient verbalized understanding     #Post-Partum  - Pain well controlled, continue current pain regimen  - Increase ambulation, SCDs when not ambulating  - Continue regular diet  - Discharge planning-stable for discharge today    ROBERTA Holt

## 2023-12-05 ENCOUNTER — NON-APPOINTMENT (OUTPATIENT)
Age: 33
End: 2023-12-05

## 2023-12-06 ENCOUNTER — NON-APPOINTMENT (OUTPATIENT)
Age: 33
End: 2023-12-06

## 2025-07-31 NOTE — DISCHARGE NOTE OB - BREASTFEEDING PROVIDES STABLE TEMPERATURE THROUGH SKIN TO SKIN CONTACT
Render Post-Care Instructions In Note?: no
Consent: The patient's consent was obtained including but not limited to risks of crusting, scabbing, blistering, scarring, darker or lighter pigmentary change, recurrence, incomplete removal and infection.
Show Aperture Variable?: Yes
Post-Care Instructions: I reviewed with the patient in detail post-care instructions. Patient is to wear sunprotection, and avoid picking at any of the treated lesions. Pt may apply Vaseline to crusted or scabbing areas.
Duration Of Freeze Thaw-Cycle (Seconds): 2
Detail Level: Detailed
Statement Selected